# Patient Record
Sex: FEMALE | Race: WHITE | NOT HISPANIC OR LATINO | URBAN - METROPOLITAN AREA
[De-identification: names, ages, dates, MRNs, and addresses within clinical notes are randomized per-mention and may not be internally consistent; named-entity substitution may affect disease eponyms.]

---

## 2019-03-16 ENCOUNTER — HOSPITAL ENCOUNTER (INPATIENT)
Facility: MEDICAL CENTER | Age: 49
LOS: 5 days | DRG: 346 | End: 2019-03-23
Attending: EMERGENCY MEDICINE | Admitting: SURGERY
Payer: COMMERCIAL

## 2019-03-16 DIAGNOSIS — G89.18 POST-OP PAIN: ICD-10-CM

## 2019-03-16 LAB
BASOPHILS # BLD AUTO: 0.4 % (ref 0–1.8)
BASOPHILS # BLD: 0.05 K/UL (ref 0–0.12)
EOSINOPHIL # BLD AUTO: 0.08 K/UL (ref 0–0.51)
EOSINOPHIL NFR BLD: 0.6 % (ref 0–6.9)
ERYTHROCYTE [DISTWIDTH] IN BLOOD BY AUTOMATED COUNT: 47 FL (ref 35.9–50)
HCT VFR BLD AUTO: 44.4 % (ref 37–47)
HGB BLD-MCNC: 14.6 G/DL (ref 12–16)
IMM GRANULOCYTES # BLD AUTO: 0.05 K/UL (ref 0–0.11)
IMM GRANULOCYTES NFR BLD AUTO: 0.4 % (ref 0–0.9)
LYMPHOCYTES # BLD AUTO: 1.7 K/UL (ref 1–4.8)
LYMPHOCYTES NFR BLD: 12.8 % (ref 22–41)
MCH RBC QN AUTO: 28.3 PG (ref 27–33)
MCHC RBC AUTO-ENTMCNC: 32.9 G/DL (ref 33.6–35)
MCV RBC AUTO: 86.2 FL (ref 81.4–97.8)
MONOCYTES # BLD AUTO: 0.68 K/UL (ref 0–0.85)
MONOCYTES NFR BLD AUTO: 5.1 % (ref 0–13.4)
NEUTROPHILS # BLD AUTO: 10.67 K/UL (ref 2–7.15)
NEUTROPHILS NFR BLD: 80.7 % (ref 44–72)
NRBC # BLD AUTO: 0 K/UL
NRBC BLD-RTO: 0 /100 WBC
PLATELET # BLD AUTO: 311 K/UL (ref 164–446)
PMV BLD AUTO: 11.8 FL (ref 9–12.9)
RBC # BLD AUTO: 5.15 M/UL (ref 4.2–5.4)
WBC # BLD AUTO: 13.2 K/UL (ref 4.8–10.8)

## 2019-03-16 PROCEDURE — 83690 ASSAY OF LIPASE: CPT

## 2019-03-16 PROCEDURE — 99285 EMERGENCY DEPT VISIT HI MDM: CPT

## 2019-03-16 PROCEDURE — 36415 COLL VENOUS BLD VENIPUNCTURE: CPT

## 2019-03-16 PROCEDURE — 80053 COMPREHEN METABOLIC PANEL: CPT

## 2019-03-16 PROCEDURE — 80307 DRUG TEST PRSMV CHEM ANLYZR: CPT

## 2019-03-16 PROCEDURE — 85025 COMPLETE CBC W/AUTO DIFF WBC: CPT

## 2019-03-16 RX ORDER — SODIUM CHLORIDE 9 MG/ML
1000 INJECTION, SOLUTION INTRAVENOUS ONCE
Status: COMPLETED | OUTPATIENT
Start: 2019-03-16 | End: 2019-03-17

## 2019-03-16 RX ORDER — MORPHINE SULFATE 4 MG/ML
4 INJECTION, SOLUTION INTRAMUSCULAR; INTRAVENOUS ONCE
Status: COMPLETED | OUTPATIENT
Start: 2019-03-16 | End: 2019-03-17

## 2019-03-16 RX ORDER — ONDANSETRON 2 MG/ML
4 INJECTION INTRAMUSCULAR; INTRAVENOUS ONCE
Status: COMPLETED | OUTPATIENT
Start: 2019-03-16 | End: 2019-03-17

## 2019-03-16 ASSESSMENT — PAIN DESCRIPTION - DESCRIPTORS: DESCRIPTORS: ACHING

## 2019-03-17 ENCOUNTER — HOSPITAL ENCOUNTER (OUTPATIENT)
Dept: RADIOLOGY | Facility: MEDICAL CENTER | Age: 49
End: 2019-03-17
Attending: EMERGENCY MEDICINE

## 2019-03-17 ENCOUNTER — APPOINTMENT (OUTPATIENT)
Dept: RADIOLOGY | Facility: MEDICAL CENTER | Age: 49
DRG: 346 | End: 2019-03-17
Attending: EMERGENCY MEDICINE
Payer: COMMERCIAL

## 2019-03-17 LAB
ALBUMIN SERPL BCP-MCNC: 4 G/DL (ref 3.2–4.9)
ALBUMIN/GLOB SERPL: 1 G/DL
ALP SERPL-CCNC: 103 U/L (ref 30–99)
ALT SERPL-CCNC: 19 U/L (ref 2–50)
AMPHET UR QL SCN: POSITIVE
ANION GAP SERPL CALC-SCNC: 8 MMOL/L (ref 0–11.9)
APPEARANCE UR: CLEAR
AST SERPL-CCNC: 21 U/L (ref 12–45)
BACTERIA #/AREA URNS HPF: ABNORMAL /HPF
BARBITURATES UR QL SCN: NEGATIVE
BENZODIAZ UR QL SCN: NEGATIVE
BILIRUB SERPL-MCNC: 0.8 MG/DL (ref 0.1–1.5)
BILIRUB UR QL STRIP.AUTO: NEGATIVE
BUN SERPL-MCNC: 17 MG/DL (ref 8–22)
BZE UR QL SCN: NEGATIVE
CALCIUM SERPL-MCNC: 9.3 MG/DL (ref 8.5–10.5)
CANNABINOIDS UR QL SCN: POSITIVE
CHLORIDE SERPL-SCNC: 100 MMOL/L (ref 96–112)
CO2 SERPL-SCNC: 27 MMOL/L (ref 20–33)
COLOR UR: YELLOW
CREAT SERPL-MCNC: 0.58 MG/DL (ref 0.5–1.4)
EPI CELLS #/AREA URNS HPF: ABNORMAL /HPF
ETHANOL BLD-MCNC: 0 G/DL
GLOBULIN SER CALC-MCNC: 3.9 G/DL (ref 1.9–3.5)
GLUCOSE SERPL-MCNC: 125 MG/DL (ref 65–99)
GLUCOSE UR STRIP.AUTO-MCNC: NEGATIVE MG/DL
KETONES UR STRIP.AUTO-MCNC: NEGATIVE MG/DL
LEUKOCYTE ESTERASE UR QL STRIP.AUTO: NEGATIVE
LIPASE SERPL-CCNC: 13 U/L (ref 11–82)
METHADONE UR QL SCN: NEGATIVE
MICRO URNS: ABNORMAL
NITRITE UR QL STRIP.AUTO: POSITIVE
OPIATES UR QL SCN: POSITIVE
OXYCODONE UR QL SCN: NEGATIVE
PCP UR QL SCN: NEGATIVE
PH UR STRIP.AUTO: 6.5 [PH]
POTASSIUM SERPL-SCNC: 4.1 MMOL/L (ref 3.6–5.5)
PROPOXYPH UR QL SCN: NEGATIVE
PROT SERPL-MCNC: 7.9 G/DL (ref 6–8.2)
PROT UR QL STRIP: NEGATIVE MG/DL
RBC # URNS HPF: ABNORMAL /HPF
RBC UR QL AUTO: NEGATIVE
SODIUM SERPL-SCNC: 135 MMOL/L (ref 135–145)
SP GR UR STRIP.AUTO: 1.01
UROBILINOGEN UR STRIP.AUTO-MCNC: 0.2 MG/DL
WBC #/AREA URNS HPF: ABNORMAL /HPF

## 2019-03-17 PROCEDURE — 700117 HCHG RX CONTRAST REV CODE 255: Performed by: EMERGENCY MEDICINE

## 2019-03-17 PROCEDURE — 700105 HCHG RX REV CODE 258: Performed by: EMERGENCY MEDICINE

## 2019-03-17 PROCEDURE — 80307 DRUG TEST PRSMV CHEM ANLYZR: CPT

## 2019-03-17 PROCEDURE — 96374 THER/PROPH/DIAG INJ IV PUSH: CPT

## 2019-03-17 PROCEDURE — 74177 CT ABD & PELVIS W/CONTRAST: CPT

## 2019-03-17 PROCEDURE — 96376 TX/PRO/DX INJ SAME DRUG ADON: CPT

## 2019-03-17 PROCEDURE — 304538 HCHG NG TUBE

## 2019-03-17 PROCEDURE — 700111 HCHG RX REV CODE 636 W/ 250 OVERRIDE (IP): Performed by: SURGERY

## 2019-03-17 PROCEDURE — 71045 X-RAY EXAM CHEST 1 VIEW: CPT | Performed by: RADIOLOGY

## 2019-03-17 PROCEDURE — 81001 URINALYSIS AUTO W/SCOPE: CPT

## 2019-03-17 PROCEDURE — 74177 CT ABD & PELVIS W/CONTRAST: CPT | Performed by: RADIOLOGY

## 2019-03-17 PROCEDURE — 96375 TX/PRO/DX INJ NEW DRUG ADDON: CPT

## 2019-03-17 PROCEDURE — 700111 HCHG RX REV CODE 636 W/ 250 OVERRIDE (IP): Performed by: EMERGENCY MEDICINE

## 2019-03-17 PROCEDURE — 71045 X-RAY EXAM CHEST 1 VIEW: CPT

## 2019-03-17 PROCEDURE — G0378 HOSPITAL OBSERVATION PER HR: HCPCS

## 2019-03-17 RX ORDER — SODIUM CHLORIDE, SODIUM LACTATE, POTASSIUM CHLORIDE, CALCIUM CHLORIDE 600; 310; 30; 20 MG/100ML; MG/100ML; MG/100ML; MG/100ML
INJECTION, SOLUTION INTRAVENOUS CONTINUOUS
Status: DISCONTINUED | OUTPATIENT
Start: 2019-03-17 | End: 2019-03-17

## 2019-03-17 RX ORDER — SODIUM CHLORIDE, SODIUM LACTATE, POTASSIUM CHLORIDE, CALCIUM CHLORIDE 600; 310; 30; 20 MG/100ML; MG/100ML; MG/100ML; MG/100ML
INJECTION, SOLUTION INTRAVENOUS CONTINUOUS
Status: DISCONTINUED | OUTPATIENT
Start: 2019-03-17 | End: 2019-03-20

## 2019-03-17 RX ORDER — HYDRALAZINE HYDROCHLORIDE 20 MG/ML
10 INJECTION INTRAMUSCULAR; INTRAVENOUS EVERY 6 HOURS PRN
Status: DISCONTINUED | OUTPATIENT
Start: 2019-03-17 | End: 2019-03-23 | Stop reason: HOSPADM

## 2019-03-17 RX ORDER — MORPHINE SULFATE 4 MG/ML
2 INJECTION, SOLUTION INTRAMUSCULAR; INTRAVENOUS
Status: DISCONTINUED | OUTPATIENT
Start: 2019-03-17 | End: 2019-03-19

## 2019-03-17 RX ORDER — ONDANSETRON 2 MG/ML
4 INJECTION INTRAMUSCULAR; INTRAVENOUS EVERY 4 HOURS PRN
Status: DISCONTINUED | OUTPATIENT
Start: 2019-03-17 | End: 2019-03-23 | Stop reason: HOSPADM

## 2019-03-17 RX ORDER — SODIUM CHLORIDE 9 MG/ML
INJECTION, SOLUTION INTRAVENOUS CONTINUOUS
Status: DISCONTINUED | OUTPATIENT
Start: 2019-03-17 | End: 2019-03-17

## 2019-03-17 RX ADMIN — MORPHINE SULFATE 2 MG: 4 INJECTION INTRAVENOUS at 19:18

## 2019-03-17 RX ADMIN — SODIUM CHLORIDE, POTASSIUM CHLORIDE, SODIUM LACTATE AND CALCIUM CHLORIDE: 600; 310; 30; 20 INJECTION, SOLUTION INTRAVENOUS at 17:51

## 2019-03-17 RX ADMIN — SODIUM CHLORIDE, POTASSIUM CHLORIDE, SODIUM LACTATE AND CALCIUM CHLORIDE: 600; 310; 30; 20 INJECTION, SOLUTION INTRAVENOUS at 07:41

## 2019-03-17 RX ADMIN — ONDANSETRON 4 MG: 2 INJECTION INTRAMUSCULAR; INTRAVENOUS at 02:18

## 2019-03-17 RX ADMIN — MORPHINE SULFATE 2 MG: 4 INJECTION INTRAVENOUS at 07:38

## 2019-03-17 RX ADMIN — SODIUM CHLORIDE 1000 ML: 9 INJECTION, SOLUTION INTRAVENOUS at 00:08

## 2019-03-17 RX ADMIN — MORPHINE SULFATE 2 MG: 4 INJECTION INTRAVENOUS at 21:53

## 2019-03-17 RX ADMIN — ONDANSETRON 4 MG: 2 INJECTION INTRAMUSCULAR; INTRAVENOUS at 21:57

## 2019-03-17 RX ADMIN — IOHEXOL 100 ML: 350 INJECTION, SOLUTION INTRAVENOUS at 00:20

## 2019-03-17 RX ADMIN — ONDANSETRON 4 MG: 2 INJECTION INTRAMUSCULAR; INTRAVENOUS at 07:38

## 2019-03-17 RX ADMIN — MORPHINE SULFATE 4 MG: 4 INJECTION INTRAVENOUS at 02:18

## 2019-03-17 RX ADMIN — ONDANSETRON 4 MG: 2 INJECTION INTRAMUSCULAR; INTRAVENOUS at 12:22

## 2019-03-17 RX ADMIN — MORPHINE SULFATE 2 MG: 4 INJECTION INTRAVENOUS at 12:22

## 2019-03-17 RX ADMIN — HYDRALAZINE HYDROCHLORIDE 10 MG: 20 INJECTION INTRAMUSCULAR; INTRAVENOUS at 17:50

## 2019-03-17 ASSESSMENT — PATIENT HEALTH QUESTIONNAIRE - PHQ9
5. POOR APPETITE OR OVEREATING: NEARLY EVERY DAY
6. FEELING BAD ABOUT YOURSELF - OR THAT YOU ARE A FAILURE OR HAVE LET YOURSELF OR YOUR FAMILY DOWN: NEARLY EVERY DAY
SUM OF ALL RESPONSES TO PHQ9 QUESTIONS 1 AND 2: 5
4. FEELING TIRED OR HAVING LITTLE ENERGY: NEARLY EVERY DAY
4. FEELING TIRED OR HAVING LITTLE ENERGY: NEARLY EVERY DAY
6. FEELING BAD ABOUT YOURSELF - OR THAT YOU ARE A FAILURE OR HAVE LET YOURSELF OR YOUR FAMILY DOWN: NEARLY EVERY DAY
5. POOR APPETITE OR OVEREATING: MORE THAN HALF THE DAYS
1. LITTLE INTEREST OR PLEASURE IN DOING THINGS: MORE THAN HALF THE DAYS
8. MOVING OR SPEAKING SO SLOWLY THAT OTHER PEOPLE COULD HAVE NOTICED. OR THE OPPOSITE, BEING SO FIGETY OR RESTLESS THAT YOU HAVE BEEN MOVING AROUND A LOT MORE THAN USUAL: MORE THAN HALF THE DAYS
9. THOUGHTS THAT YOU WOULD BE BETTER OFF DEAD, OR OF HURTING YOURSELF: SEVERAL DAYS
SUM OF ALL RESPONSES TO PHQ QUESTIONS 1-9: 20
SUM OF ALL RESPONSES TO PHQ9 QUESTIONS 1 AND 2: 5
3. TROUBLE FALLING OR STAYING ASLEEP OR SLEEPING TOO MUCH: MORE THAN HALF THE DAYS
8. MOVING OR SPEAKING SO SLOWLY THAT OTHER PEOPLE COULD HAVE NOTICED. OR THE OPPOSITE, BEING SO FIGETY OR RESTLESS THAT YOU HAVE BEEN MOVING AROUND A LOT MORE THAN USUAL: MORE THAN HALF THE DAYS
2. FEELING DOWN, DEPRESSED, IRRITABLE, OR HOPELESS: NEARLY EVERY DAY
2. FEELING DOWN, DEPRESSED, IRRITABLE, OR HOPELESS: NEARLY EVERY DAY
1. LITTLE INTEREST OR PLEASURE IN DOING THINGS: MORE THAN HALF THE DAYS
3. TROUBLE FALLING OR STAYING ASLEEP OR SLEEPING TOO MUCH: NEARLY EVERY DAY
9. THOUGHTS THAT YOU WOULD BE BETTER OFF DEAD, OR OF HURTING YOURSELF: NOT AT ALL
7. TROUBLE CONCENTRATING ON THINGS, SUCH AS READING THE NEWSPAPER OR WATCHING TELEVISION: NEARLY EVERY DAY
7. TROUBLE CONCENTRATING ON THINGS, SUCH AS READING THE NEWSPAPER OR WATCHING TELEVISION: MORE THAN HALF THE DAYS
SUM OF ALL RESPONSES TO PHQ QUESTIONS 1-9: 22

## 2019-03-17 ASSESSMENT — COPD QUESTIONNAIRES
IN THE PAST 12 MONTHS DO YOU DO LESS THAN YOU USED TO BECAUSE OF YOUR BREATHING PROBLEMS: AGREE
DURING THE PAST 4 WEEKS HOW MUCH DID YOU FEEL SHORT OF BREATH: SOME OF THE TIME
COPD SCREENING SCORE: 5
HAVE YOU SMOKED AT LEAST 100 CIGARETTES IN YOUR ENTIRE LIFE: YES
DO YOU EVER COUGH UP ANY MUCUS OR PHLEGM?: YES, A FEW DAYS A WEEK OR MONTH

## 2019-03-17 ASSESSMENT — LIFESTYLE VARIABLES
ALCOHOL_USE: NO
EVER_SMOKED: YES

## 2019-03-17 NOTE — ED NOTES
Pt BIB [place] [facility] from [employer] for N/V with blood x 1 day. Pt denies ETOH or drug use. Pt is affiliated [facility] with 160/112 BP Pt given 4mg Zofran IM pta due to lack of PIV access. Chart up for ERP

## 2019-03-17 NOTE — ED NOTES
Med Rec completed per Pt at bedside  Allergies reviewed  No ABX in last 30 days    Pt denies taking any RX's or OTC's

## 2019-03-17 NOTE — ED NOTES
Dr. Gómez changed chest tube dressing which is now clean, dry and intact. Pigtail is connected to continuous suction.
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
## 2019-03-18 ENCOUNTER — APPOINTMENT (OUTPATIENT)
Dept: RADIOLOGY | Facility: MEDICAL CENTER | Age: 49
DRG: 346 | End: 2019-03-18
Attending: SURGERY
Payer: COMMERCIAL

## 2019-03-18 LAB
ANION GAP SERPL CALC-SCNC: 7 MMOL/L (ref 0–11.9)
BUN SERPL-MCNC: 14 MG/DL (ref 8–22)
CALCIUM SERPL-MCNC: 8.9 MG/DL (ref 8.5–10.5)
CHLORIDE SERPL-SCNC: 99 MMOL/L (ref 96–112)
CO2 SERPL-SCNC: 25 MMOL/L (ref 20–33)
CREAT SERPL-MCNC: 0.57 MG/DL (ref 0.5–1.4)
ERYTHROCYTE [DISTWIDTH] IN BLOOD BY AUTOMATED COUNT: 49.1 FL (ref 35.9–50)
GLUCOSE SERPL-MCNC: 94 MG/DL (ref 65–99)
HCT VFR BLD AUTO: 42.4 % (ref 37–47)
HGB BLD-MCNC: 13.3 G/DL (ref 12–16)
MCH RBC QN AUTO: 28.2 PG (ref 27–33)
MCHC RBC AUTO-ENTMCNC: 31.4 G/DL (ref 33.6–35)
MCV RBC AUTO: 89.8 FL (ref 81.4–97.8)
PLATELET # BLD AUTO: 248 K/UL (ref 164–446)
PMV BLD AUTO: 11.5 FL (ref 9–12.9)
POTASSIUM SERPL-SCNC: 4.1 MMOL/L (ref 3.6–5.5)
RBC # BLD AUTO: 4.72 M/UL (ref 4.2–5.4)
SODIUM SERPL-SCNC: 131 MMOL/L (ref 135–145)
WBC # BLD AUTO: 10.3 K/UL (ref 4.8–10.8)

## 2019-03-18 PROCEDURE — 770006 HCHG ROOM/CARE - MED/SURG/GYN SEMI*

## 2019-03-18 PROCEDURE — 700117 HCHG RX CONTRAST REV CODE 255: Performed by: SURGERY

## 2019-03-18 PROCEDURE — 85027 COMPLETE CBC AUTOMATED: CPT

## 2019-03-18 PROCEDURE — 36415 COLL VENOUS BLD VENIPUNCTURE: CPT

## 2019-03-18 PROCEDURE — 700111 HCHG RX REV CODE 636 W/ 250 OVERRIDE (IP): Performed by: SURGERY

## 2019-03-18 PROCEDURE — 80048 BASIC METABOLIC PNL TOTAL CA: CPT

## 2019-03-18 PROCEDURE — 700105 HCHG RX REV CODE 258: Performed by: EMERGENCY MEDICINE

## 2019-03-18 PROCEDURE — 96376 TX/PRO/DX INJ SAME DRUG ADON: CPT

## 2019-03-18 RX ADMIN — ONDANSETRON 4 MG: 2 INJECTION INTRAMUSCULAR; INTRAVENOUS at 20:20

## 2019-03-18 RX ADMIN — MORPHINE SULFATE 2 MG: 4 INJECTION INTRAVENOUS at 11:21

## 2019-03-18 RX ADMIN — MORPHINE SULFATE 2 MG: 4 INJECTION INTRAVENOUS at 04:14

## 2019-03-18 RX ADMIN — MORPHINE SULFATE 2 MG: 4 INJECTION INTRAVENOUS at 18:44

## 2019-03-18 RX ADMIN — SODIUM CHLORIDE, POTASSIUM CHLORIDE, SODIUM LACTATE AND CALCIUM CHLORIDE: 600; 310; 30; 20 INJECTION, SOLUTION INTRAVENOUS at 11:21

## 2019-03-18 RX ADMIN — MORPHINE SULFATE 2 MG: 4 INJECTION INTRAVENOUS at 21:27

## 2019-03-18 RX ADMIN — ONDANSETRON 4 MG: 2 INJECTION INTRAMUSCULAR; INTRAVENOUS at 02:19

## 2019-03-18 RX ADMIN — MORPHINE SULFATE 2 MG: 4 INJECTION INTRAVENOUS at 23:38

## 2019-03-18 RX ADMIN — MORPHINE SULFATE 2 MG: 4 INJECTION INTRAVENOUS at 06:40

## 2019-03-18 RX ADMIN — MORPHINE SULFATE 2 MG: 4 INJECTION INTRAVENOUS at 02:16

## 2019-03-18 RX ADMIN — ONDANSETRON 4 MG: 2 INJECTION INTRAMUSCULAR; INTRAVENOUS at 15:31

## 2019-03-18 RX ADMIN — MORPHINE SULFATE 2 MG: 4 INJECTION INTRAVENOUS at 09:10

## 2019-03-18 RX ADMIN — MORPHINE SULFATE 2 MG: 4 INJECTION INTRAVENOUS at 16:40

## 2019-03-18 RX ADMIN — IOHEXOL 200 ML: 350 INJECTION, SOLUTION INTRAVENOUS at 13:00

## 2019-03-18 RX ADMIN — SODIUM CHLORIDE, POTASSIUM CHLORIDE, SODIUM LACTATE AND CALCIUM CHLORIDE: 600; 310; 30; 20 INJECTION, SOLUTION INTRAVENOUS at 02:23

## 2019-03-18 RX ADMIN — SODIUM CHLORIDE, POTASSIUM CHLORIDE, SODIUM LACTATE AND CALCIUM CHLORIDE: 600; 310; 30; 20 INJECTION, SOLUTION INTRAVENOUS at 23:39

## 2019-03-18 RX ADMIN — MORPHINE SULFATE 2 MG: 4 INJECTION INTRAVENOUS at 14:41

## 2019-03-18 ASSESSMENT — COGNITIVE AND FUNCTIONAL STATUS - GENERAL
STANDING UP FROM CHAIR USING ARMS: A LITTLE
DAILY ACTIVITIY SCORE: 21
MOVING FROM LYING ON BACK TO SITTING ON SIDE OF FLAT BED: A LITTLE
MOBILITY SCORE: 19
WALKING IN HOSPITAL ROOM: A LITTLE
DRESSING REGULAR LOWER BODY CLOTHING: A LITTLE
DRESSING REGULAR UPPER BODY CLOTHING: A LITTLE
MOVING TO AND FROM BED TO CHAIR: A LITTLE
CLIMB 3 TO 5 STEPS WITH RAILING: A LITTLE
SUGGESTED CMS G CODE MODIFIER DAILY ACTIVITY: CJ
SUGGESTED CMS G CODE MODIFIER MOBILITY: CK
HELP NEEDED FOR BATHING: A LITTLE

## 2019-03-18 NOTE — PROGRESS NOTES
"Assumed care of patient from RN at 0700.     Reviewed VS, labs, orders, and notes.     Pt drowsing off, arosable . A&Ox 4.    /84   Pulse (!) 105 Comment: RN notified  Temp 36.9 °C (98.5 °F) (Temporal)   Resp 14   Ht 1.626 m (5' 4\")   Wt 76.2 kg (168 lb)   LMP 03/01/2019 (Approximate)   SpO2 98%   Breastfeeding? No   BMI 28.84 kg/m² . MEWS Score: 2.     2 L oxygen. Denies SOB.     Moves all extremities, sandor numbness or tingling.     Skin assessed over bony prominences and under medical devices.    Voiding, normoactive BS, denies flatus, LBM PTA.     NPO with ice chips, denies nausea/ vomiting. NG tube to right nares to wall suction.     Wound(s): generalized bruising; healing black eyes.     Patient reports 8-10 /10 pain in abdomen, medicated per MAR.     Pt. is SBA assist with FWW.    Bed alarm on. Fall prevention education reinforced with pt. Pt is wearing treaded slipper socks, IV pole is on the same side as the bathroom, lower bedrails are down.    Discussed POC, all questions answered at this time. Bed is locked and in the lowest position, call light within reach, Q 1 hour rounding in place. All needs met at this time.   "
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
## 2019-03-18 NOTE — PROGRESS NOTES
3/18  Pt seen and examined, still no flatus or BM overnight, NGT remains in place with bilious output.  She is sitting up in bed, still having lower abdominal pain.    Abdomen benign, no peritonitis  NGT- 600cc overnight  SBFT ordered for today to see if obstruction resolving vs operable transition point

## 2019-03-18 NOTE — CARE PLAN
Problem: Pain Management  Goal: Pain level will decrease to patient's comfort goal  Outcome: PROGRESSING SLOWER THAN EXPECTED  Patient moaning frequently, rocking back and forth. 10/10 pain with muscle spasms reported.     Problem: Psychosocial Needs:  Goal: Level of anxiety will decrease  Outcome: PROGRESSING SLOWER THAN EXPECTED  Patient focused upon pain. Unable to discuss condition/anxiety/restlessness at present time.     Problem: Bowel/Gastric:  Goal: Normal bowel function is maintained or improved  Outcome: PROGRESSING AS EXPECTED  Hyperactive BS LUQ, Hypoactive BS in remainder of abdomen. MIVF running. Ambulation encouraged/refused.
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
## 2019-03-19 ENCOUNTER — APPOINTMENT (OUTPATIENT)
Dept: RADIOLOGY | Facility: MEDICAL CENTER | Age: 49
DRG: 346 | End: 2019-03-19
Attending: SURGERY
Payer: COMMERCIAL

## 2019-03-19 ENCOUNTER — APPOINTMENT (OUTPATIENT)
Dept: RADIOLOGY | Facility: MEDICAL CENTER | Age: 49
DRG: 346 | End: 2019-03-19
Attending: ANESTHESIOLOGY
Payer: COMMERCIAL

## 2019-03-19 ENCOUNTER — ANESTHESIA (OUTPATIENT)
Dept: SURGERY | Facility: MEDICAL CENTER | Age: 49
DRG: 346 | End: 2019-03-19
Payer: COMMERCIAL

## 2019-03-19 ENCOUNTER — ANESTHESIA EVENT (OUTPATIENT)
Dept: SURGERY | Facility: MEDICAL CENTER | Age: 49
DRG: 346 | End: 2019-03-19
Payer: COMMERCIAL

## 2019-03-19 LAB
ABO GROUP BLD: NORMAL
ABO GROUP BLD: NORMAL
ALBUMIN SERPL BCP-MCNC: 3.3 G/DL (ref 3.2–4.9)
ALBUMIN/GLOB SERPL: 1.1 G/DL
ALP SERPL-CCNC: 72 U/L (ref 30–99)
ALT SERPL-CCNC: 13 U/L (ref 2–50)
ANION GAP SERPL CALC-SCNC: 8 MMOL/L (ref 0–11.9)
AST SERPL-CCNC: 16 U/L (ref 12–45)
BILIRUB SERPL-MCNC: 0.7 MG/DL (ref 0.1–1.5)
BLD GP AB SCN SERPL QL: NORMAL
BUN SERPL-MCNC: 15 MG/DL (ref 8–22)
CALCIUM SERPL-MCNC: 8.7 MG/DL (ref 8.5–10.5)
CHLORIDE SERPL-SCNC: 98 MMOL/L (ref 96–112)
CO2 SERPL-SCNC: 31 MMOL/L (ref 20–33)
CREAT SERPL-MCNC: 0.53 MG/DL (ref 0.5–1.4)
GLOBULIN SER CALC-MCNC: 3.1 G/DL (ref 1.9–3.5)
GLUCOSE SERPL-MCNC: 87 MG/DL (ref 65–99)
HCG SERPL QL: NEGATIVE
POTASSIUM SERPL-SCNC: 3.7 MMOL/L (ref 3.6–5.5)
PROT SERPL-MCNC: 6.4 G/DL (ref 6–8.2)
RH BLD: NORMAL
RH BLD: NORMAL
SODIUM SERPL-SCNC: 137 MMOL/L (ref 135–145)

## 2019-03-19 PROCEDURE — 160036 HCHG PACU - EA ADDL 30 MINS PHASE I: Performed by: SURGERY

## 2019-03-19 PROCEDURE — 700101 HCHG RX REV CODE 250

## 2019-03-19 PROCEDURE — 700105 HCHG RX REV CODE 258: Performed by: EMERGENCY MEDICINE

## 2019-03-19 PROCEDURE — 770006 HCHG ROOM/CARE - MED/SURG/GYN SEMI*

## 2019-03-19 PROCEDURE — 501838 HCHG SUTURE GENERAL: Performed by: SURGERY

## 2019-03-19 PROCEDURE — 36415 COLL VENOUS BLD VENIPUNCTURE: CPT

## 2019-03-19 PROCEDURE — 700105 HCHG RX REV CODE 258

## 2019-03-19 PROCEDURE — 700101 HCHG RX REV CODE 250: Performed by: ANESTHESIOLOGY

## 2019-03-19 PROCEDURE — 700111 HCHG RX REV CODE 636 W/ 250 OVERRIDE (IP): Performed by: ANESTHESIOLOGY

## 2019-03-19 PROCEDURE — 160035 HCHG PACU - 1ST 60 MINS PHASE I: Performed by: SURGERY

## 2019-03-19 PROCEDURE — C1765 ADHESION BARRIER: HCPCS | Performed by: SURGERY

## 2019-03-19 PROCEDURE — 0DC80ZZ EXTIRPATION OF MATTER FROM SMALL INTESTINE, OPEN APPROACH: ICD-10-PCS | Performed by: SURGERY

## 2019-03-19 PROCEDURE — 700111 HCHG RX REV CODE 636 W/ 250 OVERRIDE (IP)

## 2019-03-19 PROCEDURE — 93005 ELECTROCARDIOGRAM TRACING: CPT | Performed by: SURGERY

## 2019-03-19 PROCEDURE — 160029 HCHG SURGERY MINUTES - 1ST 30 MINS LEVEL 4: Performed by: SURGERY

## 2019-03-19 PROCEDURE — 84703 CHORIONIC GONADOTROPIN ASSAY: CPT

## 2019-03-19 PROCEDURE — 0WJP4ZZ INSPECTION OF GASTROINTESTINAL TRACT, PERCUTANEOUS ENDOSCOPIC APPROACH: ICD-10-PCS | Performed by: SURGERY

## 2019-03-19 PROCEDURE — 501570 HCHG TROCAR, SEPARATOR: Performed by: SURGERY

## 2019-03-19 PROCEDURE — 86850 RBC ANTIBODY SCREEN: CPT

## 2019-03-19 PROCEDURE — 86901 BLOOD TYPING SEROLOGIC RH(D): CPT

## 2019-03-19 PROCEDURE — 160041 HCHG SURGERY MINUTES - EA ADDL 1 MIN LEVEL 4: Performed by: SURGERY

## 2019-03-19 PROCEDURE — 500800 HCHG LAPAROSCOPIC J/L HOOK: Performed by: SURGERY

## 2019-03-19 PROCEDURE — 80053 COMPREHEN METABOLIC PANEL: CPT

## 2019-03-19 PROCEDURE — 700111 HCHG RX REV CODE 636 W/ 250 OVERRIDE (IP): Performed by: SURGERY

## 2019-03-19 PROCEDURE — 86900 BLOOD TYPING SEROLOGIC ABO: CPT

## 2019-03-19 PROCEDURE — 74250 X-RAY XM SM INT 1CNTRST STD: CPT

## 2019-03-19 PROCEDURE — 160048 HCHG OR STATISTICAL LEVEL 1-5: Performed by: SURGERY

## 2019-03-19 PROCEDURE — 71045 X-RAY EXAM CHEST 1 VIEW: CPT

## 2019-03-19 PROCEDURE — 501338 HCHG SHEARS, ENDO: Performed by: SURGERY

## 2019-03-19 PROCEDURE — A4314 CATH W/DRAINAGE 2-WAY LATEX: HCPCS | Performed by: SURGERY

## 2019-03-19 PROCEDURE — 501445 HCHG STAPLER, SKIN DISP: Performed by: SURGERY

## 2019-03-19 PROCEDURE — 88305 TISSUE EXAM BY PATHOLOGIST: CPT

## 2019-03-19 PROCEDURE — 160002 HCHG RECOVERY MINUTES (STAT): Performed by: SURGERY

## 2019-03-19 PROCEDURE — 160009 HCHG ANES TIME/MIN: Performed by: SURGERY

## 2019-03-19 PROCEDURE — 501583 HCHG TROCAR, THRD CAN&SEAL 5X100: Performed by: SURGERY

## 2019-03-19 PROCEDURE — 93010 ELECTROCARDIOGRAM REPORT: CPT | Performed by: INTERNAL MEDICINE

## 2019-03-19 RX ORDER — HYDROMORPHONE HYDROCHLORIDE 1 MG/ML
0.1 INJECTION, SOLUTION INTRAMUSCULAR; INTRAVENOUS; SUBCUTANEOUS
Status: DISCONTINUED | OUTPATIENT
Start: 2019-03-19 | End: 2019-03-19 | Stop reason: HOSPADM

## 2019-03-19 RX ORDER — HALOPERIDOL 5 MG/ML
1 INJECTION INTRAMUSCULAR
Status: DISCONTINUED | OUTPATIENT
Start: 2019-03-19 | End: 2019-03-19 | Stop reason: HOSPADM

## 2019-03-19 RX ORDER — DIPHENHYDRAMINE HYDROCHLORIDE 50 MG/ML
25 INJECTION INTRAMUSCULAR; INTRAVENOUS EVERY 6 HOURS PRN
Status: DISCONTINUED | OUTPATIENT
Start: 2019-03-19 | End: 2019-03-23 | Stop reason: HOSPADM

## 2019-03-19 RX ORDER — LORAZEPAM 2 MG/ML
0.5 INJECTION INTRAMUSCULAR
Status: DISCONTINUED | OUTPATIENT
Start: 2019-03-19 | End: 2019-03-19 | Stop reason: HOSPADM

## 2019-03-19 RX ORDER — SCOLOPAMINE TRANSDERMAL SYSTEM 1 MG/1
1 PATCH, EXTENDED RELEASE TRANSDERMAL
Status: DISCONTINUED | OUTPATIENT
Start: 2019-03-19 | End: 2019-03-23 | Stop reason: HOSPADM

## 2019-03-19 RX ORDER — SODIUM CHLORIDE, SODIUM LACTATE, POTASSIUM CHLORIDE, CALCIUM CHLORIDE 600; 310; 30; 20 MG/100ML; MG/100ML; MG/100ML; MG/100ML
INJECTION, SOLUTION INTRAVENOUS
Status: COMPLETED | OUTPATIENT
Start: 2019-03-19 | End: 2019-03-19

## 2019-03-19 RX ORDER — DIPHENHYDRAMINE HYDROCHLORIDE 50 MG/ML
12.5 INJECTION INTRAMUSCULAR; INTRAVENOUS
Status: DISCONTINUED | OUTPATIENT
Start: 2019-03-19 | End: 2019-03-19 | Stop reason: HOSPADM

## 2019-03-19 RX ORDER — OXYCODONE HCL 5 MG/5 ML
5 SOLUTION, ORAL ORAL
Status: DISCONTINUED | OUTPATIENT
Start: 2019-03-19 | End: 2019-03-19 | Stop reason: HOSPADM

## 2019-03-19 RX ORDER — DEXAMETHASONE SODIUM PHOSPHATE 4 MG/ML
4 INJECTION, SOLUTION INTRA-ARTICULAR; INTRALESIONAL; INTRAMUSCULAR; INTRAVENOUS; SOFT TISSUE
Status: DISCONTINUED | OUTPATIENT
Start: 2019-03-19 | End: 2019-03-23 | Stop reason: HOSPADM

## 2019-03-19 RX ORDER — BUPIVACAINE HYDROCHLORIDE AND EPINEPHRINE 5; 5 MG/ML; UG/ML
INJECTION, SOLUTION EPIDURAL; INTRACAUDAL; PERINEURAL
Status: DISCONTINUED | OUTPATIENT
Start: 2019-03-19 | End: 2019-03-19 | Stop reason: HOSPADM

## 2019-03-19 RX ORDER — SODIUM CHLORIDE, SODIUM LACTATE, POTASSIUM CHLORIDE, CALCIUM CHLORIDE 600; 310; 30; 20 MG/100ML; MG/100ML; MG/100ML; MG/100ML
INJECTION, SOLUTION INTRAVENOUS CONTINUOUS
Status: DISCONTINUED | OUTPATIENT
Start: 2019-03-19 | End: 2019-03-19 | Stop reason: HOSPADM

## 2019-03-19 RX ORDER — TRAZODONE HYDROCHLORIDE 50 MG/1
50 TABLET ORAL NIGHTLY PRN
Status: DISCONTINUED | OUTPATIENT
Start: 2019-03-19 | End: 2019-03-23 | Stop reason: HOSPADM

## 2019-03-19 RX ORDER — SODIUM CHLORIDE 9 MG/ML
INJECTION, SOLUTION INTRAVENOUS
Status: COMPLETED
Start: 2019-03-19 | End: 2019-03-19

## 2019-03-19 RX ORDER — HALOPERIDOL 5 MG/ML
1 INJECTION INTRAMUSCULAR EVERY 6 HOURS PRN
Status: DISCONTINUED | OUTPATIENT
Start: 2019-03-19 | End: 2019-03-23 | Stop reason: HOSPADM

## 2019-03-19 RX ORDER — DEXAMETHASONE SODIUM PHOSPHATE 4 MG/ML
INJECTION, SOLUTION INTRA-ARTICULAR; INTRALESIONAL; INTRAMUSCULAR; INTRAVENOUS; SOFT TISSUE PRN
Status: DISCONTINUED | OUTPATIENT
Start: 2019-03-19 | End: 2019-03-19 | Stop reason: SURG

## 2019-03-19 RX ORDER — MORPHINE SULFATE 4 MG/ML
4 INJECTION, SOLUTION INTRAMUSCULAR; INTRAVENOUS
Status: DISCONTINUED | OUTPATIENT
Start: 2019-03-19 | End: 2019-03-19

## 2019-03-19 RX ORDER — CALCIUM CARBONATE 500 MG/1
500 TABLET, CHEWABLE ORAL
Status: DISCONTINUED | OUTPATIENT
Start: 2019-03-19 | End: 2019-03-23 | Stop reason: HOSPADM

## 2019-03-19 RX ORDER — HYDROMORPHONE HYDROCHLORIDE 1 MG/ML
0.4 INJECTION, SOLUTION INTRAMUSCULAR; INTRAVENOUS; SUBCUTANEOUS
Status: DISCONTINUED | OUTPATIENT
Start: 2019-03-19 | End: 2019-03-19 | Stop reason: HOSPADM

## 2019-03-19 RX ORDER — ONDANSETRON 2 MG/ML
4 INJECTION INTRAMUSCULAR; INTRAVENOUS EVERY 4 HOURS PRN
Status: DISCONTINUED | OUTPATIENT
Start: 2019-03-19 | End: 2019-03-19

## 2019-03-19 RX ORDER — MAGNESIUM HYDROXIDE 1200 MG/15ML
LIQUID ORAL
Status: COMPLETED | OUTPATIENT
Start: 2019-03-19 | End: 2019-03-19

## 2019-03-19 RX ORDER — CEFOTETAN DISODIUM 2 G/20ML
INJECTION, POWDER, FOR SOLUTION INTRAMUSCULAR; INTRAVENOUS PRN
Status: DISCONTINUED | OUTPATIENT
Start: 2019-03-19 | End: 2019-03-19 | Stop reason: SURG

## 2019-03-19 RX ORDER — ACETAMINOPHEN 500 MG
1000 TABLET ORAL EVERY 6 HOURS
Status: DISCONTINUED | OUTPATIENT
Start: 2019-03-19 | End: 2019-03-23 | Stop reason: HOSPADM

## 2019-03-19 RX ORDER — KETOROLAC TROMETHAMINE 30 MG/ML
INJECTION, SOLUTION INTRAMUSCULAR; INTRAVENOUS PRN
Status: DISCONTINUED | OUTPATIENT
Start: 2019-03-19 | End: 2019-03-19 | Stop reason: SURG

## 2019-03-19 RX ORDER — ONDANSETRON 2 MG/ML
4 INJECTION INTRAMUSCULAR; INTRAVENOUS
Status: DISCONTINUED | OUTPATIENT
Start: 2019-03-19 | End: 2019-03-19 | Stop reason: HOSPADM

## 2019-03-19 RX ORDER — HYDRALAZINE HYDROCHLORIDE 20 MG/ML
5 INJECTION INTRAMUSCULAR; INTRAVENOUS
Status: DISCONTINUED | OUTPATIENT
Start: 2019-03-19 | End: 2019-03-19 | Stop reason: HOSPADM

## 2019-03-19 RX ORDER — KETOROLAC TROMETHAMINE 30 MG/ML
30 INJECTION, SOLUTION INTRAMUSCULAR; INTRAVENOUS EVERY 6 HOURS
Status: COMPLETED | OUTPATIENT
Start: 2019-03-19 | End: 2019-03-22

## 2019-03-19 RX ORDER — HYDROMORPHONE HYDROCHLORIDE 1 MG/ML
0.2 INJECTION, SOLUTION INTRAMUSCULAR; INTRAVENOUS; SUBCUTANEOUS
Status: DISCONTINUED | OUTPATIENT
Start: 2019-03-19 | End: 2019-03-19 | Stop reason: HOSPADM

## 2019-03-19 RX ORDER — OXYCODONE HCL 5 MG/5 ML
10 SOLUTION, ORAL ORAL
Status: DISCONTINUED | OUTPATIENT
Start: 2019-03-19 | End: 2019-03-19 | Stop reason: HOSPADM

## 2019-03-19 RX ADMIN — SODIUM CHLORIDE 500 ML: 9 INJECTION, SOLUTION INTRAVENOUS at 20:11

## 2019-03-19 RX ADMIN — SODIUM CHLORIDE, POTASSIUM CHLORIDE, SODIUM LACTATE AND CALCIUM CHLORIDE: 600; 310; 30; 20 INJECTION, SOLUTION INTRAVENOUS at 09:44

## 2019-03-19 RX ADMIN — MORPHINE SULFATE 2 MG: 4 INJECTION INTRAVENOUS at 13:03

## 2019-03-19 RX ADMIN — DEXAMETHASONE SODIUM PHOSPHATE 4 MG: 4 INJECTION, SOLUTION INTRAMUSCULAR; INTRAVENOUS at 15:16

## 2019-03-19 RX ADMIN — MORPHINE SULFATE 2 MG: 4 INJECTION INTRAVENOUS at 02:03

## 2019-03-19 RX ADMIN — MORPHINE SULFATE 2 MG: 4 INJECTION INTRAVENOUS at 06:39

## 2019-03-19 RX ADMIN — HYDROMORPHONE HYDROCHLORIDE 0.2 MG: 1 INJECTION, SOLUTION INTRAMUSCULAR; INTRAVENOUS; SUBCUTANEOUS at 18:22

## 2019-03-19 RX ADMIN — SODIUM CHLORIDE: 9 INJECTION, SOLUTION INTRAVENOUS at 20:30

## 2019-03-19 RX ADMIN — PROPOFOL 200 MG: 10 INJECTION, EMULSION INTRAVENOUS at 15:16

## 2019-03-19 RX ADMIN — FENTANYL CITRATE 50 MCG: 50 INJECTION, SOLUTION INTRAMUSCULAR; INTRAVENOUS at 16:05

## 2019-03-19 RX ADMIN — ROCURONIUM BROMIDE 50 MG: 10 INJECTION, SOLUTION INTRAVENOUS at 15:16

## 2019-03-19 RX ADMIN — SODIUM CHLORIDE, POTASSIUM CHLORIDE, SODIUM LACTATE AND CALCIUM CHLORIDE: 600; 310; 30; 20 INJECTION, SOLUTION INTRAVENOUS at 15:16

## 2019-03-19 RX ADMIN — FENTANYL CITRATE 50 MCG: 50 INJECTION, SOLUTION INTRAMUSCULAR; INTRAVENOUS at 17:17

## 2019-03-19 RX ADMIN — FENTANYL CITRATE 50 MCG: 50 INJECTION, SOLUTION INTRAMUSCULAR; INTRAVENOUS at 17:50

## 2019-03-19 RX ADMIN — FENTANYL CITRATE 50 MCG: 50 INJECTION, SOLUTION INTRAMUSCULAR; INTRAVENOUS at 15:16

## 2019-03-19 RX ADMIN — ONDANSETRON 4 MG: 2 INJECTION INTRAMUSCULAR; INTRAVENOUS at 15:16

## 2019-03-19 RX ADMIN — MORPHINE SULFATE 2 MG: 4 INJECTION INTRAVENOUS at 04:53

## 2019-03-19 RX ADMIN — DIPHENHYDRAMINE HYDROCHLORIDE 25 MG: 50 INJECTION INTRAMUSCULAR; INTRAVENOUS at 20:30

## 2019-03-19 RX ADMIN — ONDANSETRON 4 MG: 2 INJECTION INTRAMUSCULAR; INTRAVENOUS at 02:03

## 2019-03-19 RX ADMIN — ONDANSETRON 4 MG: 2 INJECTION INTRAMUSCULAR; INTRAVENOUS at 06:37

## 2019-03-19 RX ADMIN — ONDANSETRON 4 MG: 2 INJECTION INTRAMUSCULAR; INTRAVENOUS at 19:19

## 2019-03-19 RX ADMIN — MORPHINE SULFATE: 50 INJECTION, SOLUTION, CONCENTRATE INTRAVENOUS at 19:55

## 2019-03-19 RX ADMIN — KETOROLAC TROMETHAMINE 30 MG: 30 INJECTION, SOLUTION INTRAMUSCULAR at 15:16

## 2019-03-19 RX ADMIN — CEFOTETAN DISODIUM 2 G: 2 INJECTION, POWDER, FOR SOLUTION INTRAMUSCULAR; INTRAVENOUS at 15:00

## 2019-03-19 RX ADMIN — FENTANYL CITRATE 50 MCG: 50 INJECTION, SOLUTION INTRAMUSCULAR; INTRAVENOUS at 15:45

## 2019-03-19 RX ADMIN — KETOROLAC TROMETHAMINE 30 MG: 30 INJECTION, SOLUTION INTRAMUSCULAR; INTRAVENOUS at 20:11

## 2019-03-19 RX ADMIN — ONDANSETRON 4 MG: 2 INJECTION INTRAMUSCULAR; INTRAVENOUS at 13:14

## 2019-03-19 RX ADMIN — HYDROMORPHONE HYDROCHLORIDE 0.4 MG: 1 INJECTION, SOLUTION INTRAMUSCULAR; INTRAVENOUS; SUBCUTANEOUS at 18:07

## 2019-03-19 RX ADMIN — FENTANYL CITRATE 50 MCG: 50 INJECTION, SOLUTION INTRAMUSCULAR; INTRAVENOUS at 15:00

## 2019-03-19 RX ADMIN — HYDROMORPHONE HYDROCHLORIDE 0.4 MG: 1 INJECTION, SOLUTION INTRAMUSCULAR; INTRAVENOUS; SUBCUTANEOUS at 17:53

## 2019-03-19 RX ADMIN — FENTANYL CITRATE 50 MCG: 50 INJECTION, SOLUTION INTRAMUSCULAR; INTRAVENOUS at 17:40

## 2019-03-19 RX ADMIN — MORPHINE SULFATE 2 MG: 4 INJECTION INTRAVENOUS at 09:21

## 2019-03-19 ASSESSMENT — PAIN SCALES - GENERAL: PAIN_LEVEL: 4

## 2019-03-19 NOTE — OR SURGEON
Immediate Post OP Note    PreOp Diagnosis: Small Bowel Obstruction    PostOp Diagnosis: SBO secondary to intestinal Bezor    Procedure(s):  DIAGNOSTIC LAPAROSCOPY - Wound Class: Clean Contaminated  LAPAROTOMY, EXPLORATORY - Wound Class: Clean Contaminated    Surgeon(s):  Manish Padron M.D.    Anesthesiologist/Type of Anesthesia:  Anesthesiologist: Kristofer Bustamante M.D./General    Surgical Staff:  Circulator: Lise Jacobo R.N.  Relief Circulator: Katherine Velarde R.N.  Relief Scrub: Francia Denise  Scrub Person: Zac Way Ass't; Missymarie Clifford R.N.    Specimens removed if any:  ID Type Source Tests Collected by Time Destination   A : Small bowel bezoar Other Other PATHOLOGY SPECIMEN Manish Padron M.D. 3/19/2019  4:32 PM    B : Omentum Other Other PATHOLOGY SPECIMEN Manish Padron M.D. 3/19/2019  4:44 PM        Estimated Blood Loss: 50cc    Findings: Inflamed dilated small bowel to the terminal ileum.  Open exploration necessary, identified a bezor in the small bowel.  Enterotomy placed and object extracted, closure with hemostasis.      Complications: none        3/19/2019 4:55 PM Manish Padron M.D.

## 2019-03-19 NOTE — PROGRESS NOTES
3/19  Pt seen and examined, had SBFT yesterday without production of BM.  Series shows dilated small bowel still present overnight with contrast.  Symptoms are not improved today and she still has some abdominal pain and distension.  Better with NGT to suction, which is still producing bilious output.      Abdomen soft, mildly distended, no peritonitis    SBO without improvement.  To OR today for exploration.  Will attempt laparoscopic lysis of adhesions, possible open surgery as indicated.  Explained procedure to patient and she agrees with treatment plan.  She is NPO, keep NGT to suction.

## 2019-03-19 NOTE — PROGRESS NOTES
"Received report from AM RN; assumed care. A&O 2-4 (patient not answering questions), BP mildly elevated, tachycardia noted. Medicated for pain/nausea frequently; see MAR. NG tube clamped for KUB series. Partial assessment completed, patient refusing d/t pain/nausea. Patient \"just wants to sleep.\" Pain reports 10/10 with patient falling asleep shortly after MS/Zofran administration. Patient refused to get out of bed d/t pain/nausea. Awaiting until bladder is full to void, then hastily hurrying to the bathroom with SBA, steady/limping gait noted. Bed alarm engaged. + void. UTD flatus. LBM PTA. Hyperactive BS x 4.  NG tube in right nare with tape/primpore attached. Abdomen soft, tender. Patient loudly stating, \"don't touch my stomach.\" NPO, ice chips provided.     Patient not allowing education/discussions. POC still not discussed except for KUB series. Patient nearly refused contrast until instructed entire process would have to be performed again by radiology tech.      Bed locked/lowest position. Call light/personal belongings within reach. Patient across from nurses station. Bed alarm in place. All needs met at present/patient sleeping at present time.   "

## 2019-03-19 NOTE — PROGRESS NOTES
Xray taken and need to be repeated. Xray tech returned and injected more contrast. Patient to be kept clamped. Repeat xray to be performed at 0300.

## 2019-03-19 NOTE — PROGRESS NOTES
"Assumed care of patient from RN at 0700.      Reviewed VS, labs, orders, and notes. Dr Padron planning surgery today, consent is signed and in chart.     Pt drowsing off, arosable . A&Ox 4.     /84   Pulse (!) 105 Comment: RN notified  Temp 36.9 °C (98.5 °F) (Temporal)   Resp 14   Ht 1.626 m (5' 4\")   Wt 76.2 kg (168 lb)   LMP 03/01/2019 (Approximate)   SpO2 98%   Breastfeeding? No   BMI 28.84 kg/m² . MEWS Score: 1.      2 L oxygen. Denies SOB.      Moves all extremities, sandor numbness or tingling.      Skin assessed over bony prominences and under medical devices.     Voiding, hyperactive BS, denies flatus, LBM PTA.      NPO with ice chips, naseous.previously medicated per MAR NG tube to right nares to wall suction will large green output.      Wound(s): generalized bruising; healing bilateral black eyes.      Patient reports 7 /10 pain in abdomen, medicated per MAR.      Pt. is SBA assist with FWW.     Bed alarm on. Fall prevention education reinforced with pt. Pt is wearing treaded slipper socks, IV pole is on the same side as the bathroom, lower bedrails are down.     Discussed POC, all questions answered at this time. Bed is locked and in the lowest position, call light within reach, Q 1 hour rounding in place. All needs met at this time.   "
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
## 2019-03-19 NOTE — ANESTHESIA PREPROCEDURE EVALUATION
Relevant Problems   No relevant active problems       Physical Exam    Airway   Mallampati: II  TM distance: >3 FB  Neck ROM: full       Cardiovascular - normal exam  Rhythm: regular  Rate: normal  (-) murmur     Dental - normal exam  (+) upper dentures, lower dentures         Pulmonary - normal exam  Breath sounds clear to auscultation     Abdominal   Abdomen: tender     Neurological - normal exam                 Anesthesia Plan    ASA 3 - emergent   ASA physical status 3 criteria: COPD - poorly controlled, respiratory insufficiency or compromise, alcohol and/or substance dependence or abuse and CVA or TIA - history (> 3 months)ASA physical status emergent criteria: acute peritonitis and compromised vital organ, limb or tissue    Plan - general             Induction: intravenous    Postoperative Plan: Postoperative administration of opioids is intended.    Pertinent diagnostic labs and testing reviewed    Informed Consent:    Anesthetic plan and risks discussed with patient.    Use of blood products discussed with: patient whom consented to blood products.

## 2019-03-19 NOTE — CARE PLAN
Problem: Nutritional:  Goal: Achieve adequate nutritional intake  Advance diet beyond CLD and patient will consume 50% or greater of meals  Outcome: NOT MET  See RD note from 3/19

## 2019-03-19 NOTE — CARE PLAN
"Problem: Communication  Goal: The ability to communicate needs accurately and effectively will improve  Outcome: PROGRESSING SLOWER THAN EXPECTED  Patient not engaging with communicating/questioning. Patient able to articulate pain/nausea, moaning and yelling for \"help.\" Patient repeating that she feels like \"shit.\"     Problem: Safety  Goal: Will remain free from injury  Outcome: PROGRESSING SLOWER THAN EXPECTED  Patient refusing to get out of bed. Patient waiting until bladder full to ambulate to the bathroom. Attempted to educated upon risk for falling. Patient told to slow down when ambulating to the bathroom, not complying with instruction. Bed alarm on/engaged. Patient room next to RN station with RN sitting outside of door.     Problem: Infection  Goal: Will remain free from infection  Outcome: NOT MET  Patient refusing hygiene. Refusing education.       "

## 2019-03-19 NOTE — PROGRESS NOTES
Received call from XRay, requesting to keep NG clamped, will redo Xray in 6 hours. Large emesis noted after Xray study. Patient expressed relief after emesis.

## 2019-03-19 NOTE — PROGRESS NOTES
"MD approached about KUB series not working and plan for patient. MD said \"nothing is moving\" and that patient would be taken down to the OR today. Place patient back on NG wall suction. Endorsed to AM RN.   "

## 2019-03-20 LAB
ANION GAP SERPL CALC-SCNC: 7 MMOL/L (ref 0–11.9)
BUN SERPL-MCNC: 19 MG/DL (ref 8–22)
CALCIUM SERPL-MCNC: 8.3 MG/DL (ref 8.5–10.5)
CHLORIDE SERPL-SCNC: 99 MMOL/L (ref 96–112)
CO2 SERPL-SCNC: 30 MMOL/L (ref 20–33)
CREAT SERPL-MCNC: 0.57 MG/DL (ref 0.5–1.4)
EKG IMPRESSION: NORMAL
ERYTHROCYTE [DISTWIDTH] IN BLOOD BY AUTOMATED COUNT: 48.1 FL (ref 35.9–50)
GLUCOSE SERPL-MCNC: 113 MG/DL (ref 65–99)
HCT VFR BLD AUTO: 37.9 % (ref 37–47)
HGB BLD-MCNC: 12.1 G/DL (ref 12–16)
MAGNESIUM SERPL-MCNC: 1.5 MG/DL (ref 1.5–2.5)
MCH RBC QN AUTO: 27.9 PG (ref 27–33)
MCHC RBC AUTO-ENTMCNC: 31.9 G/DL (ref 33.6–35)
MCV RBC AUTO: 87.5 FL (ref 81.4–97.8)
PATHOLOGY CONSULT NOTE: NORMAL
PLATELET # BLD AUTO: 248 K/UL (ref 164–446)
PMV BLD AUTO: 11.4 FL (ref 9–12.9)
POTASSIUM SERPL-SCNC: 3.9 MMOL/L (ref 3.6–5.5)
RBC # BLD AUTO: 4.33 M/UL (ref 4.2–5.4)
SODIUM SERPL-SCNC: 136 MMOL/L (ref 135–145)
WBC # BLD AUTO: 3.1 K/UL (ref 4.8–10.8)

## 2019-03-20 PROCEDURE — 80048 BASIC METABOLIC PNL TOTAL CA: CPT

## 2019-03-20 PROCEDURE — 700105 HCHG RX REV CODE 258

## 2019-03-20 PROCEDURE — 700105 HCHG RX REV CODE 258: Performed by: SURGERY

## 2019-03-20 PROCEDURE — 83735 ASSAY OF MAGNESIUM: CPT

## 2019-03-20 PROCEDURE — A9270 NON-COVERED ITEM OR SERVICE: HCPCS | Performed by: SURGERY

## 2019-03-20 PROCEDURE — 700101 HCHG RX REV CODE 250: Performed by: SURGERY

## 2019-03-20 PROCEDURE — 700111 HCHG RX REV CODE 636 W/ 250 OVERRIDE (IP): Performed by: SURGERY

## 2019-03-20 PROCEDURE — 770006 HCHG ROOM/CARE - MED/SURG/GYN SEMI*

## 2019-03-20 PROCEDURE — 85027 COMPLETE CBC AUTOMATED: CPT

## 2019-03-20 PROCEDURE — 700102 HCHG RX REV CODE 250 W/ 637 OVERRIDE(OP): Performed by: SURGERY

## 2019-03-20 RX ORDER — SODIUM CHLORIDE 9 MG/ML
INJECTION, SOLUTION INTRAVENOUS
Status: COMPLETED
Start: 2019-03-20 | End: 2019-03-20

## 2019-03-20 RX ORDER — DEXTROSE MONOHYDRATE, SODIUM CHLORIDE, AND POTASSIUM CHLORIDE 50; 1.49; 4.5 G/1000ML; G/1000ML; G/1000ML
INJECTION, SOLUTION INTRAVENOUS CONTINUOUS
Status: DISCONTINUED | OUTPATIENT
Start: 2019-03-20 | End: 2019-03-21

## 2019-03-20 RX ADMIN — MORPHINE SULFATE: 50 INJECTION, SOLUTION, CONCENTRATE INTRAVENOUS at 14:44

## 2019-03-20 RX ADMIN — SODIUM CHLORIDE 1000 ML: 9 INJECTION, SOLUTION INTRAVENOUS at 12:11

## 2019-03-20 RX ADMIN — KETOROLAC TROMETHAMINE 30 MG: 30 INJECTION, SOLUTION INTRAMUSCULAR; INTRAVENOUS at 06:02

## 2019-03-20 RX ADMIN — POTASSIUM CHLORIDE, DEXTROSE MONOHYDRATE AND SODIUM CHLORIDE: 150; 5; 450 INJECTION, SOLUTION INTRAVENOUS at 09:58

## 2019-03-20 RX ADMIN — SODIUM CHLORIDE 500 ML: 9 INJECTION, SOLUTION INTRAVENOUS at 12:10

## 2019-03-20 RX ADMIN — ONDANSETRON 4 MG: 2 INJECTION INTRAMUSCULAR; INTRAVENOUS at 18:56

## 2019-03-20 RX ADMIN — KETOROLAC TROMETHAMINE 30 MG: 30 INJECTION, SOLUTION INTRAMUSCULAR; INTRAVENOUS at 17:47

## 2019-03-20 RX ADMIN — ONDANSETRON 4 MG: 2 INJECTION INTRAMUSCULAR; INTRAVENOUS at 14:36

## 2019-03-20 RX ADMIN — ACETAMINOPHEN 1000 MG: 500 TABLET ORAL at 01:18

## 2019-03-20 RX ADMIN — KETOROLAC TROMETHAMINE 30 MG: 30 INJECTION, SOLUTION INTRAMUSCULAR; INTRAVENOUS at 01:18

## 2019-03-20 RX ADMIN — ONDANSETRON 4 MG: 2 INJECTION INTRAMUSCULAR; INTRAVENOUS at 04:48

## 2019-03-20 RX ADMIN — KETOROLAC TROMETHAMINE 30 MG: 30 INJECTION, SOLUTION INTRAMUSCULAR; INTRAVENOUS at 11:56

## 2019-03-20 RX ADMIN — ENOXAPARIN SODIUM 40 MG: 100 INJECTION SUBCUTANEOUS at 09:53

## 2019-03-20 RX ADMIN — SODIUM CHLORIDE, POTASSIUM CHLORIDE, SODIUM LACTATE AND CALCIUM CHLORIDE: 600; 310; 30; 20 INJECTION, SOLUTION INTRAVENOUS at 04:50

## 2019-03-20 NOTE — PROGRESS NOTES
Pt is A&O 4  Pain well controlled on PCA.  Pt slightly lethargic throughout night, easily aroused with voice stimuli  Denies nausea  Pt currently NPO with sips for meds, asking to be advanced to clear liquids.  Pt has NG tube to low continuous suction.  Surgical incision to midline, CDI, midline dressing has scant shadowing.  + Urine Void per wells catheter, order to d/c in AM  - Flatus  - BM Void  Up standby with FWW, pt declines to get OOB this PM d/t pain   SCD's on  Bed alarm on  Reviewed plan of care with patient, bed in lowest position and locked, pt resting comfortably now, call light within reach, all needs met at this time

## 2019-03-20 NOTE — OR NURSING
1719: received to PACU via bed. Respirations spontaneous and unlabored. Midline abdominal dressing CDI. 3 surgical lap site wounds with gauze and tape CDI. Ice pack placed to abdomen abdomen soft. NGT to low contrant suction. abdomen soft.  1740: c/o pain. Medicated. See MAR  1750: pain scale 10/10, medicated. See MAR.  1753: still moaning. C/o pain. Medicated. See MAR  1807: pain scale 8/10. Medicated. See MAR  1817: Dr. Bustamante notified or X'ray results. Okay to use central line.  1822: still c/o pain. Medicated. See MAR  1843: report called to Marycarmen WADE.  1850: Patient refused to get out of bed in PACU.  1900: transported via bed to [place] [id number] by transport with O2 at 10 L / Oxymask. Stable.

## 2019-03-20 NOTE — ANESTHESIA PROCEDURE NOTES
Central Venous Line  Performed by: CHRISTELLE GRIGSBY  Authorized by: CHRISTELLE GRIGSBY     Start Time:  3/19/2019 5:00 PM  End Time:  3/19/2019 5:05 PM  Patient Location:  OR  Indication: central venous access    provider hand hygiene performed prior to central venous catheter insertion, all 5 sterile barriers used (gloves, gown, cap, mask, large sterile drape) during central venous catheter insertion and skin prep agent completely dried prior to procedure    Patient Position:  Trendelenburg  Laterality:  Right  Site:  Internal jugular  Prep:  Chlorhexidine  Catheter Size:  7 Fr  Number of Lumens:  Triple lumen  target vein identified, needle advanced into vein and blood aspirated and guidewire advanced into vein    Seldinger Technique?: Yes    Ultrasound-Guided: ultrasound-guided  Image captured, interpreted and electronically stored.  Sterile Gel and Probe Cover Used for Ultrasound?: Yes    Intravenous Verification: verified by ultrasound, venous blood return and chest x-ray pending    all ports aspirated, all ports flushed easily, guidewire was removed intact, biopatch was applied, line was sutured in place and dressing was applied    Events: patient tolerated procedure well with no complications

## 2019-03-20 NOTE — PROGRESS NOTES
Report received.  Assumed Care.  Pt in bed, T416 bed 1. AOx4, responds appropriately. Medicated for pain with ordered PCA, currently pain 10/10, denies SOB.  Plan of care discussed.  Explained importance of calling before getting OOB and pt verbalizes understanding.  Call light and belongings within reach, treaded slipper socks on, bed alarm in use, bed in lowest position.  Will monitor frequently.

## 2019-03-20 NOTE — PROGRESS NOTES
"3/20  S:  49 y.o.female s/p ExLap with enterotomy for removal of a bezoar  POD# 1.  Patient doing well this morning, sitting up in bed, feels hungry, wants to start PO.  NGT with minimal output overnight, 150cc only entire shift.  Pain well controlled with PCA at this time as well    O:  Blood pressure 108/85, pulse 100, temperature 36.1 °C (97 °F), temperature source Temporal, resp. rate 18, height 1.626 m (5' 4\"), weight 76.2 kg (168 lb), last menstrual period 03/01/2019, SpO2 95 %, not currently breastfeeding.  I/O last 3 completed shifts:  In: 2980 [I.V.:2980]  Out: 2395 [Urine:375]  Recent Labs      03/18/19   0307  03/19/19   1459  03/20/19   0520   SODIUM  131*  137  136   POTASSIUM  4.1  3.7  3.9   CHLORIDE  99  98  99   CO2  25  31  30   GLUCOSE  94  87  113*   BUN  14  15  19   CREATININE  0.57  0.53  0.57   CALCIUM  8.9  8.7  8.3*     Recent Labs      03/18/19   0307  03/20/19   0520   WBC  10.3  3.1*   RBC  4.72  4.33   HEMOGLOBIN  13.3  12.1   HEMATOCRIT  42.4  37.9   MCV  89.8  87.5   MCH  28.2  27.9   MCHC  31.4*  31.9*   RDW  49.1  48.1   PLATELETCT  248  248   MPV  11.5  11.4       Alert and Oriented x3, No Acute Distress  Normal Respiratory Effort  Abdomen soft, appropriately tender  Incisions/Bandages clean/dry/intact  Extremities warm and well perfused  NGT- 1700cc yesterday, 150 only overnight    A/P:  Pain control with PCA until tolerating PO  Diet clear liquids after NGT removed  Fluids continue maintence  Ambulate tid and ad kamar    "

## 2019-03-20 NOTE — CARE PLAN
Problem: Venous Thromboembolism (VTW)/Deep Vein Thrombosis (DVT) Prevention:  Goal: Patient will participate in Venous Thrombosis (VTE)/Deep Vein Thrombosis (DVT)Prevention Measures  Outcome: PROGRESSING AS EXPECTED  SCD's on.   Lovenox in place.   Pt encouraged to ambulate.     Problem: Mobility  Goal: Risk for activity intolerance will decrease    Intervention: Encourage patient to increase activity level in collaboration with Interdisciplinary Team  Patient encouraged to increase mobility with the assistance of staff.   Education provided regarding importance of increased mobility in relation to recovery- pt states she understands the importance.

## 2019-03-20 NOTE — PROGRESS NOTES
Bedside report received.  Assessment complete.  A&O x 4. Patient calls appropriately.  Patient up with one assist. Bed alarm on.   Patient has 6/10 pain. PCA in place.   Denies N&V. Tolerating sips of clears at this point- clear liquid diet to start this AM.  Surgical incision noted to abdomen, dressing is clean, dry, and intact.  Cheney removed this AM, pt needs to void by 1400, denies flatus, no BM.  Patient denies SOB.  SCD's on.  Patient resting in bed.  Review plan with of care with patient. Call light and personal belongings with in reach. Hourly rounding in place. All needs met at this time.

## 2019-03-20 NOTE — ANESTHESIA TIME REPORT
Anesthesia Start and Stop Event Times     Date Time Event    3/19/2019 1500 Anesthesia Start     1737 Anesthesia Stop        Responsible Staff  03/19/19    Name Role Begin End    Kristofer Bustamante M.D. Anesth 1500 1737        Post op Diagnosis  Bowel obstruction (HCC)    Premium Reason  B. 1st Call      Exception Times    Start Time             End Time                     #                         Name                                                Comments:      Central line placement

## 2019-03-20 NOTE — CARE PLAN
Problem: Safety  Goal: Will remain free from falls  Outcome: PROGRESSING AS EXPECTED  Pt has bed alarm in place, educated not to get OOB without staff present.  Pt states understanding, has call light within reach, calls appropriately at this time

## 2019-03-20 NOTE — CARE PLAN
Problem: Pain Management  Goal: Pain level will decrease to patient's comfort goal  Outcome: PROGRESSING AS EXPECTED  Pt's pain adequately controlled with PCA at this time

## 2019-03-20 NOTE — OP REPORT
DATE OF SERVICE:  03/19/2019    PREOPERATIVE DIAGNOSIS:  Small-bowel obstruction.    POSTOPERATIVE DIAGNOSIS:  Small-bowel obstruction secondary to bezoar.    PROCEDURES PERFORMED:  1.  Diagnostic laparoscopy.  2.  Exploratory laparotomy.    SURGEON:  Manish Padron MD    ASSISTANT:  None.    ANESTHESIA:  General endotracheal anesthesia.    ANESTHESIOLOGIST:  Kristofer Bustamante MD    ESTIMATED BLOOD LOSS:  50 mL    SPECIMENS:  1.  Small bowel bezoar.  2.  Omentum.    COMPLICATIONS:  None.    CONDITION:  Stable.    INDICATIONS FOR PROCEDURE:  This is a 49-year-old female who presents with a   small-bowel obstruction and a history of exploratory surgery for trauma.  The   patient has refractory symptoms after 48 hours in the hospital, she had a   small bowel follow through, which demonstrated ongoing blockage in the small   intestine.  Risks, benefits, and alternatives of exploratory surgery were   explained to her and we took her to the OR for further procedure.    OPERATIVE FINDINGS:  Dilated small bowel all the way to the terminal ileum   with little to no small bowel adhesions present.  Cecum mobilized with no   evidence of large tumor.  Open exploratory surgery performed with palpation of   the small bowel and discovery of a bezoar in the distal terminal ileum.    Enterotomy completed with extraction of the offending object and closure with   hemostasis.    OPERATIVE TECHNIQUE:  After informed consent was obtained, the patient was   taken to the operating room and placed in the supine position.  After adequate   endotracheal anesthesia was achieved, the abdomen was prepped and draped in a   sterile fashion.  Operation was begun by placing a 5 mm left upper quadrant   incision through which a 5 mm trocar was introduced into the abdomen using   Optiview technique.  After pneumoperitoneum was achieved, additional trocars   were placed, two 5 mm trocars in the left flank.  All trocars were placed with   0.5%  Marcaine with epinephrine for local anesthesia.    We then began by taking down some omental adhesions from the anterior   abdominal wall with cautery.  Once we were able to do this, we had good   visualization of most of the abdomen.  We then began by exploring the distal   small bowel.  The cecum was in the retroperitoneum, but we were able to   identify the appendix.  We then used cautery to free up the lateral   attachments of the cecum such that we could mobilize it upwards.  Again, due   to the dilated state of the small bowel, this was difficult to visualize, but   we could determine that there was no adhesive disease among the small bowel   and there did not appear to be a clear sharp transition point in the terminal   ileum.  Suspicion was raised for a neoplasm versus some other process causing   the blockage; therefore, we decided to proceed with open surgery.    Next, we made a midline laparotomy incision after removing the trocars.  This   was carried down with electrocautery to the level of fascia, which was incised   in the midline.  Incision was extended and we placed handheld retractors and   we were able to palpate the cecum and ileocecal valve.  We could not identify   any tumor or mass at this location.  We were, however, able to palpate the   small bowel and we discovered an object within the lumen of small intestine   causing the blockage.  This was milked proximally.  We then toweled off the   segment of the small bowel, placed a longitudinal incision, which was a full   thickness enterotomy.  We then extracted what appeared to be a food bezoar out   of the small bowel.  Once this was extracted, there did not appear to be any   other obstruction lesions present.    We closed the enterotomy in layers transversely with interrupted 3-0 Vicryl   sutures.  We then were able to milk enteric contents past the closure with no   evidence of leak and we noticed filling of the cecum and transverse colon    indicating the obstruction had been relieved.    The bowel was all reduced back into the abdominal cavity.  The abdomen was   washed with hemostasis.  We then placed Seprafilm under and above the omentum.    We closed the abdominal wall with 2 running 0 PDS sutures tied in the   midline.  Deep dermal tissues were closed with 3-0 Vicryl and skin with   staples.  We closed laparoscopic port sites with staples as well.  All   instruments counts were correct at the end of the procedure.  The patient was   returned to the PACU in stable condition.       ____________________________________     MD HAMLET Archibald / ROSSY    DD:  03/19/2019 17:16:24  DT:  03/19/2019 18:28:28    D#:  [id number]  Job#:  [id number]

## 2019-03-20 NOTE — ANESTHESIA POSTPROCEDURE EVALUATION
Patient: Bhumi Granado    Procedure Summary     Date:  03/19/19 Room / Location:  [facility] OR 08 / SURGERY [facility] ORS    Anesthesia Start:  1500 Anesthesia Stop:      Procedures:       DIAGNOSTIC LAPAROSCOPY (Abdomen)      LAPAROTOMY, EXPLORATORY (Abdomen) Diagnosis:  (Abdominal pain and distention)    Surgeon:  Manish Padron M.D. Responsible Provider:  Kristofer Bustamante M.D.    Anesthesia Type:  general ASA Status:  3 - Emergent          Final Anesthesia Type: general  Last vitals  BP   Blood Pressure: 120/74, NIBP: (!) 165/102    Temp   36.7 °C (98.1 °F)    Pulse   Pulse: 97, Heart Rate (Monitored): 96   Resp   16    SpO2   99 %      Anesthesia Post Evaluation    Patient location during evaluation: PACU  Patient participation: complete - patient participated  Level of consciousness: awake and alert  Pain score: 4    Airway patency: patent  Anesthetic complications: no  Cardiovascular status: hemodynamically stable  Respiratory status: acceptable  Hydration status: euvolemic    PONV: none           Nurse Pain Score: 10 (NPRS)

## 2019-03-20 NOTE — ANESTHESIA QCDR
2019 [facility] Clinical Data Registry (for Quality Improvement)     Postoperative nausea/vomiting risk protocol (Adult = 18 yrs and Pediatric 3-17 yrs)- (430 and 463)  General inhalation anesthetic (NOT TIVA) with PONV risk factors: Yes  Provision of anti-emetic therapy with at least 2 different classes of agents: Yes   Patient DID NOT receive anti-emetic therapy and reason is documented in Medical Record:  N/A    Multimodal Pain Management- (AQI59)  Patient undergoing Elective Surgery (i.e. Outpatient, or ASC, or Prescheduled Surgery prior to Hospital Admission): Yes  Use of Multimodal Pain Management, two or more drugs and/or interventions, NOT including systemic opioids: Yes   Exception: Documented allergy to multiple classes of analgesics:  N/A    PACU assessment of acute postoperative pain prior to Anesthesia Care End- Applies to Patients Age = 18- (ABG7)  Initial PACU pain score is which of the following: < 7/10  Patient unable to report pain score: N/A    Post-anesthetic transfer of care checklist/protocol to PACU/ICU- (426 and 427)  Upon conclusion of case, patient transferred to which of the following locations: PACU/Non-ICU  Use of transfer checklist/protocol: Yes  Exclusion: Service Performed in Patient Hospital Room (and thus did not require transfer): N/A    PACU Reintubation- (AQI31)  General anesthesia requiring endotracheal intubation (ETT) along with subsequent extubation in OR or PACU: No  Required reintubation in the PACU: N/A  Extubation was a planned trial documented in the medical record prior to removal of the original airway device: N/A    Unplanned admission to ICU related to anesthesia service up through end of PACU care- (MD51)  Unplanned admission to ICU (not initially anticipated at anesthesia start time): No

## 2019-03-21 LAB
ANION GAP SERPL CALC-SCNC: 6 MMOL/L (ref 0–11.9)
BUN SERPL-MCNC: 13 MG/DL (ref 8–22)
CALCIUM SERPL-MCNC: 7.7 MG/DL (ref 8.5–10.5)
CHLORIDE SERPL-SCNC: 100 MMOL/L (ref 96–112)
CO2 SERPL-SCNC: 29 MMOL/L (ref 20–33)
CREAT SERPL-MCNC: 0.49 MG/DL (ref 0.5–1.4)
ERYTHROCYTE [DISTWIDTH] IN BLOOD BY AUTOMATED COUNT: 50.3 FL (ref 35.9–50)
GLUCOSE SERPL-MCNC: 139 MG/DL (ref 65–99)
HCT VFR BLD AUTO: 30.7 % (ref 37–47)
HGB BLD-MCNC: 9.8 G/DL (ref 12–16)
MCH RBC QN AUTO: 28.4 PG (ref 27–33)
MCHC RBC AUTO-ENTMCNC: 31.9 G/DL (ref 33.6–35)
MCV RBC AUTO: 89 FL (ref 81.4–97.8)
PLATELET # BLD AUTO: 221 K/UL (ref 164–446)
PMV BLD AUTO: 11.2 FL (ref 9–12.9)
POTASSIUM SERPL-SCNC: 3.4 MMOL/L (ref 3.6–5.5)
RBC # BLD AUTO: 3.45 M/UL (ref 4.2–5.4)
SODIUM SERPL-SCNC: 135 MMOL/L (ref 135–145)
WBC # BLD AUTO: 5.3 K/UL (ref 4.8–10.8)

## 2019-03-21 PROCEDURE — A9270 NON-COVERED ITEM OR SERVICE: HCPCS | Performed by: SURGERY

## 2019-03-21 PROCEDURE — 700111 HCHG RX REV CODE 636 W/ 250 OVERRIDE (IP): Performed by: SURGERY

## 2019-03-21 PROCEDURE — 80048 BASIC METABOLIC PNL TOTAL CA: CPT

## 2019-03-21 PROCEDURE — 700101 HCHG RX REV CODE 250: Performed by: SURGERY

## 2019-03-21 PROCEDURE — 85027 COMPLETE CBC AUTOMATED: CPT

## 2019-03-21 PROCEDURE — 700102 HCHG RX REV CODE 250 W/ 637 OVERRIDE(OP): Performed by: SURGERY

## 2019-03-21 PROCEDURE — 770006 HCHG ROOM/CARE - MED/SURG/GYN SEMI*

## 2019-03-21 RX ORDER — OXYCODONE HYDROCHLORIDE 5 MG/1
5 TABLET ORAL
Status: DISCONTINUED | OUTPATIENT
Start: 2019-03-21 | End: 2019-03-23 | Stop reason: HOSPADM

## 2019-03-21 RX ORDER — OXYCODONE HYDROCHLORIDE 10 MG/1
10 TABLET ORAL
Status: DISCONTINUED | OUTPATIENT
Start: 2019-03-21 | End: 2019-03-23 | Stop reason: HOSPADM

## 2019-03-21 RX ADMIN — ONDANSETRON 4 MG: 2 INJECTION INTRAMUSCULAR; INTRAVENOUS at 05:48

## 2019-03-21 RX ADMIN — KETOROLAC TROMETHAMINE 30 MG: 30 INJECTION, SOLUTION INTRAMUSCULAR; INTRAVENOUS at 18:07

## 2019-03-21 RX ADMIN — ONDANSETRON 4 MG: 2 INJECTION INTRAMUSCULAR; INTRAVENOUS at 23:09

## 2019-03-21 RX ADMIN — ACETAMINOPHEN 1000 MG: 500 TABLET ORAL at 01:08

## 2019-03-21 RX ADMIN — ENOXAPARIN SODIUM 40 MG: 100 INJECTION SUBCUTANEOUS at 09:47

## 2019-03-21 RX ADMIN — KETOROLAC TROMETHAMINE 30 MG: 30 INJECTION, SOLUTION INTRAMUSCULAR; INTRAVENOUS at 05:41

## 2019-03-21 RX ADMIN — OXYCODONE HYDROCHLORIDE 5 MG: 5 TABLET ORAL at 18:16

## 2019-03-21 RX ADMIN — ACETAMINOPHEN 1000 MG: 500 TABLET ORAL at 13:21

## 2019-03-21 RX ADMIN — POTASSIUM CHLORIDE, DEXTROSE MONOHYDRATE AND SODIUM CHLORIDE: 150; 5; 450 INJECTION, SOLUTION INTRAVENOUS at 03:30

## 2019-03-21 RX ADMIN — KETOROLAC TROMETHAMINE 30 MG: 30 INJECTION, SOLUTION INTRAMUSCULAR; INTRAVENOUS at 01:08

## 2019-03-21 RX ADMIN — ONDANSETRON 4 MG: 2 INJECTION INTRAMUSCULAR; INTRAVENOUS at 18:08

## 2019-03-21 RX ADMIN — OXYCODONE HYDROCHLORIDE 10 MG: 10 TABLET ORAL at 23:09

## 2019-03-21 RX ADMIN — KETOROLAC TROMETHAMINE 30 MG: 30 INJECTION, SOLUTION INTRAMUSCULAR; INTRAVENOUS at 13:22

## 2019-03-21 RX ADMIN — ACETAMINOPHEN 1000 MG: 500 TABLET ORAL at 05:41

## 2019-03-21 RX ADMIN — ONDANSETRON 4 MG: 2 INJECTION INTRAMUSCULAR; INTRAVENOUS at 01:08

## 2019-03-21 RX ADMIN — MORPHINE SULFATE: 50 INJECTION, SOLUTION, CONCENTRATE INTRAVENOUS at 09:34

## 2019-03-21 NOTE — PROGRESS NOTES
Pt is A&O 4  Pain controlled on current regimen.  Pt has PCA in place, utilizing as needed  Slight nausea, controlled with zofran per MAR  Tolerating Diet clears  Surgical incision to midline, CDI, dressing has scant old drainage  + Urine Void   + Flatus  - BM Void  Up standby   SCD's on  Bed alarm on, moderate fall risk per Jane ethan scale  Reviewed plan of care with patient, bed in lowest position and locked, pt resting comfortably now, call light within reach, all needs met at this time

## 2019-03-21 NOTE — PROGRESS NOTES
Pt alert and oriented. Walking 1 assist. Dressings dry and intact. Cont clear liquid diet. Active bowel sounds, passing gas. Cont PCA morphine, pain controlled. Voiding adequate amounts. hgb 9.8, monitor. Cont plan of care.

## 2019-03-21 NOTE — DIETARY
NUTRITION SERVICES - Brief Nutrition POC Update    NPO / clear liquids day 5.  Pt with hx of poor PO intake x1 day PTA.     Clear liquids initiated 3/20 and PO intake has been 50-75% x1 and % x1 meal on this diet. Pt seen in room this afternoon. She states that she is hungry and wants her diet advanced. D/w MD and he would like to round on the pt and determine whether diet advancement is feasible at that time. OK to add Boost breeze supplements in the interim to support her nutritional intake.    Plan:  1) Diet advancement per MD when feasible. Boost Breeze TID with meals until diet is advanced.    RD following.

## 2019-03-21 NOTE — CARE PLAN
Problem: Safety  Goal: Will remain free from injury  Outcome: PROGRESSING AS EXPECTED  Pt educated to call prior to getting OOB, pt states understanding, has call light within reach.  Pt has bed alarm in place, moderate fall risk per dwayne long    Problem: Fluid Volume:  Goal: Will maintain balanced intake and output  Outcome: PROGRESSING AS EXPECTED  Pt voiding adequately, has IV fluids per MAR

## 2019-03-22 LAB
ERYTHROCYTE [DISTWIDTH] IN BLOOD BY AUTOMATED COUNT: 50.4 FL (ref 35.9–50)
HCT VFR BLD AUTO: 32 % (ref 37–47)
HGB BLD-MCNC: 10.2 G/DL (ref 12–16)
MCH RBC QN AUTO: 28.4 PG (ref 27–33)
MCHC RBC AUTO-ENTMCNC: 31.9 G/DL (ref 33.6–35)
MCV RBC AUTO: 89.1 FL (ref 81.4–97.8)
PLATELET # BLD AUTO: 242 K/UL (ref 164–446)
PMV BLD AUTO: 11.5 FL (ref 9–12.9)
RBC # BLD AUTO: 3.59 M/UL (ref 4.2–5.4)
WBC # BLD AUTO: 6.3 K/UL (ref 4.8–10.8)

## 2019-03-22 PROCEDURE — 700111 HCHG RX REV CODE 636 W/ 250 OVERRIDE (IP): Performed by: SURGERY

## 2019-03-22 PROCEDURE — A9270 NON-COVERED ITEM OR SERVICE: HCPCS | Performed by: SURGERY

## 2019-03-22 PROCEDURE — 770006 HCHG ROOM/CARE - MED/SURG/GYN SEMI*

## 2019-03-22 PROCEDURE — 85027 COMPLETE CBC AUTOMATED: CPT

## 2019-03-22 PROCEDURE — 700102 HCHG RX REV CODE 250 W/ 637 OVERRIDE(OP): Performed by: SURGERY

## 2019-03-22 RX ADMIN — ACETAMINOPHEN 1000 MG: 500 TABLET ORAL at 00:57

## 2019-03-22 RX ADMIN — ENOXAPARIN SODIUM 40 MG: 100 INJECTION SUBCUTANEOUS at 10:13

## 2019-03-22 RX ADMIN — KETOROLAC TROMETHAMINE 30 MG: 30 INJECTION, SOLUTION INTRAMUSCULAR; INTRAVENOUS at 05:57

## 2019-03-22 RX ADMIN — ACETAMINOPHEN 1000 MG: 500 TABLET ORAL at 17:00

## 2019-03-22 RX ADMIN — KETOROLAC TROMETHAMINE 30 MG: 30 INJECTION, SOLUTION INTRAMUSCULAR; INTRAVENOUS at 00:57

## 2019-03-22 RX ADMIN — OXYCODONE HYDROCHLORIDE 10 MG: 10 TABLET ORAL at 06:04

## 2019-03-22 RX ADMIN — KETOROLAC TROMETHAMINE 30 MG: 30 INJECTION, SOLUTION INTRAMUSCULAR; INTRAVENOUS at 12:04

## 2019-03-22 RX ADMIN — OXYCODONE HYDROCHLORIDE 10 MG: 10 TABLET ORAL at 10:13

## 2019-03-22 RX ADMIN — OXYCODONE HYDROCHLORIDE 10 MG: 10 TABLET ORAL at 21:09

## 2019-03-22 RX ADMIN — ACETAMINOPHEN 1000 MG: 500 TABLET ORAL at 12:04

## 2019-03-22 RX ADMIN — ACETAMINOPHEN 1000 MG: 500 TABLET ORAL at 05:57

## 2019-03-22 RX ADMIN — OXYCODONE HYDROCHLORIDE 10 MG: 10 TABLET ORAL at 16:46

## 2019-03-22 NOTE — CARE PLAN
Problem: Pain Management  Goal: Pain level will decrease to patient’s comfort goal  Outcome: PROGRESSING AS EXPECTED  Pt has pain in abdomen, receiving PRN pain meds per MAR, repositioned for comfort. Non-pharmacologic options offered.    Problem: Mobility  Goal: Risk for activity intolerance will decrease  Outcome: PROGRESSING AS EXPECTED  Pt is a SBA with FWW. PT/OT ordered. Pt mobilized up in halls.

## 2019-03-22 NOTE — PROGRESS NOTES
Pt is A&O 4  Pain managing at this time on PO medication  Slight nausea, medicated with zofran per MAR  Tolerating Diet GI soft  Surgical incision to midline, CDI, dressing has scant old drainage  + Urine Void   + Flatus  - BM Void  Up standby, ambulated in hallway  SCD's on  Bed alarm on, low fall risk per Jane ethan scale  Reviewed plan of care with patient, bed in lowest position and locked, pt resting comfortably now, call light within reach, all needs met at this time

## 2019-03-22 NOTE — PROGRESS NOTES
"S:  49 y.o.female s/p enterotomy for SBO    POD# 3.  Patient doing ok this am, resting in bed.  States that she \"cant get up out of bed\" for unclear reasons.  She states her pain \"is and isn't\" controlled.  Denies any N/V, voids w/o difficulty, passing flatus but no stool as of yet.  Tolerating diet.      O:  Blood pressure 152/76, pulse 73, temperature 36.4 °C (97.6 °F), temperature source Temporal, resp. rate 18, height 1.626 m (5' 4\"), weight 76.2 kg (168 lb), last menstrual period 03/01/2019, SpO2 95 %, not currently breastfeeding.  I/O last 3 completed shifts:  In: 1738.5 [P.O.:1080; I.V.:658.5]  Out: 2650 [Urine:2650]  Recent Labs      03/19/19   1459  03/20/19   0520  03/21/19   0335   SODIUM  137  136  135   POTASSIUM  3.7  3.9  3.4*   CHLORIDE  98  99  100   CO2  31  30  29   GLUCOSE  87  113*  139*   BUN  15  19  13   CREATININE  0.53  0.57  0.49*   CALCIUM  8.7  8.3*  7.7*     Recent Labs      03/20/19   0520  03/21/19   0335  03/22/19   0510   WBC  3.1*  5.3  6.3   RBC  4.33  3.45*  3.59*   HEMOGLOBIN  12.1  9.8*  10.2*   HEMATOCRIT  37.9  30.7*  32.0*   MCV  87.5  89.0  89.1   MCH  27.9  28.4  28.4   MCHC  31.9*  31.9*  31.9*   RDW  48.1  50.3*  50.4*   PLATELETCT  248  221  242   MPV  11.4  11.2  11.5       Alert and Oriented x3, No Acute Distress  Normal Respiratory Effort  Abdomen soft, appropriately tender  Incisions/Bandages clean/dry/intact  Extremities warm and well perfused      A/P:  Pain control po and IV prn, minimize if able  Diet as tolerated  Fluids TKO  Ambulate tid and ad kamar, will get PT/OT consult to evaluate ambulation  Encourage IS  Once cleared by PT/OT, anticipate discharge    "

## 2019-03-22 NOTE — CARE PLAN
Problem: Pain Management  Goal: Pain level will decrease to patient's comfort goal  Outcome: PROGRESSING AS EXPECTED  Pt transitioned to PO medication for pain management    Problem: Bowel/Gastric:  Goal: Normal bowel function is maintained or improved  Outcome: PROGRESSING AS EXPECTED  Pt +flatus, encouraging ambulation, -BM, normoactive bowel sounds x4

## 2019-03-22 NOTE — CARE PLAN
Problem: Nutritional:  Goal: Achieve adequate nutritional intake  Advance diet beyond CLD and patient will consume 50% or greater of meals   Outcome: PROGRESSING AS EXPECTED  Pt seen today for food preferences. She is happy that her diet was advanced. PO intake % of breakfast this morning. She would like to add snacks between meals. Preferences obtained. Will discontinue clear liquid supplements now that diet is advanced.

## 2019-03-22 NOTE — PROGRESS NOTES
"3/21  S:  49 y.o.female s/p ExLap, enterotomy for SBO  POD# 2.  Patient doing great today, tolerating liquids Po and wants to advance diet.  She is passing flatus and stool and able to ambulate at this time as well.  Pain is well controlled at this time and she denies any nausea or emesis    O:  Blood pressure 110/71, pulse 90, temperature 36.3 °C (97.3 °F), temperature source Temporal, resp. rate 17, height 1.626 m (5' 4\"), weight 76.2 kg (168 lb), last menstrual period 03/01/2019, SpO2 98 %, not currently breastfeeding.  I/O last 3 completed shifts:  In: 3709.7 [P.O.:1080; I.V.:2629.7]  Out: 1775 [Urine:1625]  Recent Labs      03/19/19   1459  03/20/19   0520  03/21/19   0335   SODIUM  137  136  135   POTASSIUM  3.7  3.9  3.4*   CHLORIDE  98  99  100   CO2  31  30  29   GLUCOSE  87  113*  139*   BUN  15  19  13   CREATININE  0.53  0.57  0.49*   CALCIUM  8.7  8.3*  7.7*     Recent Labs      03/20/19   0520  03/21/19   0335   WBC  3.1*  5.3   RBC  4.33  3.45*   HEMOGLOBIN  12.1  9.8*   HEMATOCRIT  37.9  30.7*   MCV  87.5  89.0   MCH  27.9  28.4   MCHC  31.9*  31.9*   RDW  48.1  50.3*   PLATELETCT  248  221   MPV  11.4  11.2       Alert and Oriented x3, No Acute Distress  Normal Respiratory Effort  Abdomen soft, appropriately tender  Incisions/Bandages clean/dry/intact  Extremities warm and well perfused    A/P:  Pain control with PO meds, d/c PCA  Diet as toleraated  Fluids SLIV  Ambulate tid and ad kamar  Likely home tomorrow, ok to shower with incision uncovered    "

## 2019-03-22 NOTE — PROGRESS NOTES
Report received, poc discussed, assumed care of pt.   Call light in reach, hourly rounding in place.   Pt gets up SBA with FWW.   GI soft diet.  + void. LBM pta.   Oxy for pain.  No further needs.

## 2019-03-23 VITALS
HEIGHT: 64 IN | SYSTOLIC BLOOD PRESSURE: 129 MMHG | BODY MASS INDEX: 28.68 KG/M2 | TEMPERATURE: 97.2 F | RESPIRATION RATE: 16 BRPM | OXYGEN SATURATION: 92 % | HEART RATE: 90 BPM | DIASTOLIC BLOOD PRESSURE: 74 MMHG | WEIGHT: 168 LBS

## 2019-03-23 PROCEDURE — 700111 HCHG RX REV CODE 636 W/ 250 OVERRIDE (IP): Performed by: SURGERY

## 2019-03-23 PROCEDURE — 700102 HCHG RX REV CODE 250 W/ 637 OVERRIDE(OP): Performed by: SURGERY

## 2019-03-23 PROCEDURE — A9270 NON-COVERED ITEM OR SERVICE: HCPCS | Performed by: SURGERY

## 2019-03-23 RX ORDER — OXYCODONE HYDROCHLORIDE AND ACETAMINOPHEN 5; 325 MG/1; MG/1
1-2 TABLET ORAL EVERY 4 HOURS PRN
Qty: 30 TAB | Refills: 0 | Status: SHIPPED | OUTPATIENT
Start: 2019-03-23 | End: 2019-04-06

## 2019-03-23 RX ADMIN — OXYCODONE HYDROCHLORIDE 10 MG: 10 TABLET ORAL at 09:19

## 2019-03-23 RX ADMIN — OXYCODONE HYDROCHLORIDE 10 MG: 10 TABLET ORAL at 00:41

## 2019-03-23 RX ADMIN — ENOXAPARIN SODIUM 40 MG: 100 INJECTION SUBCUTANEOUS at 09:19

## 2019-03-23 RX ADMIN — ACETAMINOPHEN 1000 MG: 500 TABLET ORAL at 11:36

## 2019-03-23 RX ADMIN — ACETAMINOPHEN 1000 MG: 500 TABLET ORAL at 00:41

## 2019-03-23 RX ADMIN — OXYCODONE HYDROCHLORIDE 10 MG: 10 TABLET ORAL at 05:30

## 2019-03-23 RX ADMIN — ACETAMINOPHEN 1000 MG: 500 TABLET ORAL at 05:30

## 2019-03-23 NOTE — PROGRESS NOTES
Pt discharged with CNA in W/C. Prescriptions were given x 1. D/C instructions signed and placed in chart. Chart given to U/C. Charge notified. Medications from pts drawer returned to pharmacy. Controlled substance use informed consent signed.    Pt left with FWW

## 2019-03-23 NOTE — CARE PLAN
Problem: Safety  Goal: Will remain free from falls    Intervention: Assess risk factors for falls  Pt room close to nursing station       Problem: Infection  Goal: Will remain free from infection    Intervention: Assess signs and symptoms of infection  Checked morning labs for s/s of infection

## 2019-03-23 NOTE — PROGRESS NOTES
Bedside report received.  Assessment complete.  A&O x 4. Patient calls appropriately.  Patient up with SB assist. Bed alarm NI.   Patient has 8/10 pain. Medicated perMAR  Denies N&V. Tolerating GI soft diet.  Surgical midline and lap stabs with staples, QUAN, CDI.  + void, 3/16 BM.  Patient denies SOB.  Review plan with of care with patient. Call light and personal belongings with in reach. Hourly rounding in place. All needs met at this time.

## 2019-03-23 NOTE — DISCHARGE INSTRUCTIONS
Discharge Instructions    Discharged to home by car with relative. Discharged via wheelchair, hospital escort: Yes.  Special equipment needed: Not Applicable    Be sure to schedule a follow-up appointment with your primary care doctor or any specialists as instructed.     Discharge Plan:   Smoking Cessation Offered: Patient Refused  Pneumococcal Vaccine Administered/Refused: Not given - Patient refused pneumococcal vaccine  Influenza Vaccine Indication: Patient Refuses    I understand that a diet low in cholesterol, fat, and sodium is recommended for good health. Unless I have been given specific instructions below for another diet, I accept this instruction as my diet prescription.   Other diet: per MD order    Special Instructions: None    · Is patient discharged on Warfarin / Coumadin?   No     Depression / Suicide Risk    As you are discharged from this [facility] facility, it is important to learn how to keep safe from harming yourself.    Recognize the warning signs:  · Abrupt changes in personality, positive or negative- including increase in energy   · Giving away possessions  · Change in eating patterns- significant weight changes-  positive or negative  · Change in sleeping patterns- unable to sleep or sleeping all the time   · Unwillingness or inability to communicate  · Depression  · Unusual sadness, discouragement and loneliness  · Talk of wanting to die  · Neglect of personal appearance   · Rebelliousness- reckless behavior  · Withdrawal from people/activities they love  · Confusion- inability to concentrate     If you or a loved one observes any of these behaviors or has concerns about self-harm, here's what you can do:  · Talk about it- your feelings and reasons for harming yourself  · Remove any means that you might use to hurt yourself (examples: pills, rope, extension cords, firearm)  · Get professional help from the community (Mental Health, Substance Abuse, psychological counseling)  · Do not  be alone:Call your Safe Contact- someone whom you trust who will be there for you.  · Call your local CRISIS HOTLINE 049-6141 or 906-647-3469  · Call your local Children's Mobile Crisis Response Team [place] (018) 201-8418 or www.Sounday  · Call the toll free National Suicide Prevention Hotlines   · National Suicide Prevention Lifeline 404-039-CYBC (9813)  · [employer] Hope Line Network 800-SUICIDE (574-3726)      Incision Care, Adult  An incision is a cut that a doctor makes in your skin for surgery (for a procedure). Most times, these cuts are closed after surgery. Your cut from surgery may be closed with stitches (sutures), staples, skin glue, or skin tape (adhesive strips). You may need to return to your doctor to have stitches or staples taken out. This may happen many days or many weeks after your surgery. The cut needs to be well cared for so it does not get infected.  How to care for your cut  Cut care  · Follow instructions from your doctor about how to take care of your cut. Make sure you:  ¨ Wash your hands with soap and water before you change your bandage (dressing). If you cannot use soap and water, use hand .  ¨ Change your bandage as told by your doctor.  ¨ Leave stitches, skin glue, or skin tape in place. They may need to stay in place for 2 weeks or longer. If tape strips get loose and curl up, you may trim the loose edges. Do not remove tape strips completely unless your doctor says it is okay.  · Check your cut area every day for signs of infection. Check for:  ¨ More redness, swelling, or pain.  ¨ More fluid or blood.  ¨ Warmth.  ¨ Pus or a bad smell.  · Ask your doctor how to clean the cut. This may include:  ¨ Using mild soap and water.  ¨ Using a clean towel to pat the cut dry after you clean it.  ¨ Putting a cream or ointment on the cut. Do this only as told by your doctor.  ¨ Covering the cut with a clean bandage.  · Ask your doctor when you can leave the cut  uncovered.  · Do not take baths, swim, or use a hot tub until your doctor says it is okay. Ask your doctor if you can take showers. You may only be allowed to take sponge baths for bathing.  Medicines  · If you were prescribed an antibiotic medicine, cream, or ointment, take the antibiotic or put it on the cut as told by your doctor. Do not stop taking or putting on the antibiotic even if your condition gets better.  · Take over-the-counter and prescription medicines only as told by your doctor.  General instructions  · Limit movement around your cut. This helps healing.  ¨ Avoid straining, lifting, or exercise for the first month, or for as long as told by your doctor.  ¨ Follow instructions from your doctor about going back to your normal activities.  ¨ Ask your doctor what activities are safe.  · Protect your cut from the sun when you are outside for the first 6 months, or for as long as told by your doctor. Put on sunscreen around the scar or cover up the scar.  · Keep all follow-up visits as told by your doctor. This is important.  Contact a doctor if:  · Your have more redness, swelling, or pain around the cut.  · You have more fluid or blood coming from the cut.  · Your cut feels warm to the touch.  · You have pus or a bad smell coming from the cut.  · You have a fever or shaking chills.  · You feel sick to your stomach (nauseous) or you throw up (vomit).  · You are dizzy.  · Your stitches or staples come undone.  Get help right away if:  · You have a red streak coming from your cut.  · Your cut bleeds through the bandage and the bleeding does not stop with gentle pressure.  · The edges of your cut open up and separate.  · You have very bad (severe) pain.  · You have a rash.  · You are confused.  · You pass out (faint).  · You have trouble breathing and you have a fast heartbeat.  This information is not intended to replace advice given to you by your health care provider. Make sure you discuss any questions  you have with your health care provider.  Document Released: 03/11/2013 Document Revised: 08/25/2017 Document Reviewed: 08/25/2017  Elsevier Interactive Patient Education © 2017 [employer]

## 2019-03-23 NOTE — DISCHARGE SUMMARY
Discharge Summary      DATE OF ADMISSION: 3/16/2019    DATE OF DISCHARGE: 3/23/2019    ADMISSION DIAGNOSIS (ES):   Small Bowel Obstruction    DISCHARGE DIAGNOSIS (ES):  SBO secondary to intestinal Bezor    DISCHARGE CONDITION:  ? stable    CONSULTATIONS:  ? none    PROCEDURES:  ? Diagnostic laparoscopy, exploratory laparotomy     BRIEF HPI:  This is a 49-year-old female who presents with a   small-bowel obstruction and a history of exploratory surgery for trauma.  The   patient has refractory symptoms after 48 hours in the hospital, she had a   small bowel follow through, which demonstrated ongoing blockage in the small   intestine.  Risks, benefits, and alternatives of exploratory surgery were   explained to her and we took her to the OR for further procedure.    HOSPITAL COURSE:  ? Pt underwent surgery as described above, tolerated procedure well w/o complications.  Is now eating, denies any N/V, voids w/o difficulty and has positive bowel sounds along with passing flatus.  Meets surgical criteria to discharge.        MEDS:   No current outpatient prescriptions on file.       FOLLOW-UP:  ? Please call my office at [phone] to make an appointment in 1 week.    DISCHARGE INSTRUCTIONS:  May shower  No lifting greater than 20 pounds for 4-6 weeks  Diet as tolerated, chew food thoroughly

## 2019-03-23 NOTE — CARE PLAN
"Problem: Bowel/Gastric:  Goal: Will not experience complications related to bowel motility  Outcome: PROGRESSING AS EXPECTED  Normoactive BS x 4 noted. Patient complaining food isn't moving. Fluids/ambulation encouraged. Patient advised to slow down on food/snacks for the NOC shift. Verbalized understanding.     Problem: Knowledge Deficit  Goal: Knowledge of disease process/condition, treatment plan, diagnostic tests, and medications will improve  Outcome: PROGRESSING AS EXPECTED  Educated patient about advancement of diet. Verbalized understanding.     Problem: Mobility  Goal: Risk for activity intolerance will decrease  Outcome: PROGRESSING SLOWER THAN EXPECTED  Encouraged increased ambulation. Patient stated \"I can't. I get dizzy and I'm in too much pain.\" Oxygen provided. Patient complied, moaning during activity.       "

## 2019-03-23 NOTE — PROGRESS NOTES
"Received report from AM RN; assumed care. A&O x 3. VSS. 94% on 2L d/t keeping neck flexed, positioning in bed. Patient approached for assessment, calm/resting in bed. Once spoken to, moaning/grimacing/tearfulness noted. Patient reporting pain in mid/upper epigastric area at a 10/10 for pain; medicated. See MAR. Patient stated feels like food is sitting and not moving; encouraged ambulation. Patient complied, steady gait noted with CNA. Patient moaning intermittently during ambulation. Abdomen semi-firm, tender. MLI QUAN with staples, CDI. Patient denies flatus. Normoactive BS x 4 noted. POC discussed. Encouraged IS and increased ambulation, patient states she \"can't because of pain.\" POC discussed. Bed locked/lowest position. Call light/personal belongings within reach. All needs met at present time.   "

## 2025-01-17 NOTE — ANESTHESIA PROCEDURE NOTES
Airway  Date/Time: 3/19/2019 3:16 PM  Performed by: CHRISTELLE GRIGSBY  Authorized by: CHRISTELLE GRIGSBY     Location:  OR  Urgency:  Elective  Indications for Airway Management:  Anesthesia  Spontaneous Ventilation: absent    Sedation Level:  Deep  Preoxygenated: Yes    Patient Position:  Sniffing  Final Airway Type:  Endotracheal airway  Final Endotracheal Airway:  ETT  Cuffed: Yes    Technique Used for Successful ETT Placement:  Direct laryngoscopy  Insertion Site:  Oral  Blade Type:  Pena  Laryngoscope Blade/Videolaryngoscope Blade Size:  2  ETT Size (mm):  7.5  Measured from:  Teeth  ETT to Teeth (cm):  22  Placement Verified by: auscultation and capnometry    Cormack-Lehane Classification:  Grade IIa - partial view of glottis  Number of Attempts at Approach:  1         40

## (undated) DEVICE — LACTATED RINGERS INJ 1000 ML - (14EA/CA 60CA/PF)

## (undated) DEVICE — GLOVE BIOGEL SZ 7 SURGICAL PF LTX - (50PR/BX 4BX/CA)

## (undated) DEVICE — SUTURE GENERAL

## (undated) DEVICE — PACK MAJOR BASIN - (2EA/CA)

## (undated) DEVICE — SUTURE 4-0 MONOCRYL PLUS PS-2 - 27 INCH (36/BX)

## (undated) DEVICE — GLOVE BIOGEL SZ 6.5 SURGICAL PF LTX (50PR/BX 4BX/CA)

## (undated) DEVICE — SET EXTENSION WITH 2 PORTS (48EA/CA) ***PART #2C8610 IS A SUBSTITUTE*****

## (undated) DEVICE — SCISSORS 5MM CVD (6EA/BX)

## (undated) DEVICE — SET SUCTION/IRRIGATION WITH DISPOSABLE TIP (6/CA )PART #0250-070-520 IS A SUB

## (undated) DEVICE — NEPTUNE 4 PORT MANIFOLD - (20/PK)

## (undated) DEVICE — GOWN WARMING STANDARD FLEX - (30/CA)

## (undated) DEVICE — TUBING CLEARLINK DUO-VENT - C-FLO (48EA/CA)

## (undated) DEVICE — SENSOR SPO2 NEO LNCS ADHESIVE (20/BX) SEE USER NOTES

## (undated) DEVICE — ELECTRODE 5MM LHK LAPSCP STERILE DISP- MEGADYNE  (5/CA)

## (undated) DEVICE — SUTURE 3-0 VICRYL PLUS SH - 8X 18 INCH (12/BX)

## (undated) DEVICE — PAD LAP STERILE 18 X 18 - (5/PK 40PK/CA)

## (undated) DEVICE — STAPLER SKIN DISP - (6/BX 10BX/CA) VISISTAT

## (undated) DEVICE — GLOVE SZ 6 BIOGEL PI MICRO - PF LF (50PR/BX 4BX/CA)

## (undated) DEVICE — CHLORAPREP 26 ML APPLICATOR - ORANGE TINT(25/CA)

## (undated) DEVICE — CANNULA W/SEAL 5X100 Z-THRE - ADED KII (12/BX)

## (undated) DEVICE — ELECTRODE 850 FOAM ADHESIVE - HYDROGEL RADIOTRNSPRNT (50/PK)

## (undated) DEVICE — ELECTRODE DUAL RETURN W/ CORD - (50/PK)

## (undated) DEVICE — SUTURE 0 COATED VICRYL 6-18IN - (12PK/BX)

## (undated) DEVICE — GLOVE BIOGEL PI INDICATOR SZ 6.5 SURGICAL PF LF - (50/BX 4BX/CA)

## (undated) DEVICE — KIT ANESTHESIA W/CIRCUIT & 3/LT BAG W/FILTER (20EA/CA)

## (undated) DEVICE — TROCAR 5X100 NON BLADED Z-TH - READ KII (6/BX)

## (undated) DEVICE — DETERGENT RENUZYME PLUS 10 OZ PACKET (50/BX)

## (undated) DEVICE — SET LEADWIRE 5 LEAD BEDSIDE DISPOSABLE ECG (1SET OF 5/EA)

## (undated) DEVICE — MASK ANESTHESIA ADULT  - (100/CA)

## (undated) DEVICE — SODIUM CHL IRRIGATION 0.9% 1000ML (12EA/CA)

## (undated) DEVICE — GLOVE BIOGEL INDICATOR SZ 7SURGICAL PF LTX - (50/BX 4BX/CA)

## (undated) DEVICE — GOWN SURGEONS LARGE - (32/CA)

## (undated) DEVICE — GLOVE BIOGEL INDICATOR SZ 6.5 SURGICAL PF LTX - (50PR/BX 4BX/CA)

## (undated) DEVICE — SUCTION INSTRUMENT YANKAUER BULBOUS TIP W/O VENT (50EA/CA)

## (undated) DEVICE — DRAPE SURG STERI-DRAPE 7X11OD - (40EA/CA)

## (undated) DEVICE — SUTURE 1 PDS II PLUS TP-1 - (12PK/BX)

## (undated) DEVICE — KIT ROOM DECONTAMINATION

## (undated) DEVICE — SLEEVE, VASO, THIGH, MED

## (undated) DEVICE — PROTECTOR ULNA NERVE - (36PR/CA)

## (undated) DEVICE — TRAY CATHETER FOLEY URINE METER W/STATLOCK 350ML (10EA/CA)

## (undated) DEVICE — GRAFT MESH SEPRAFILM PRO PACK - 5/BX CONTAINS 6 3X5 PIECES

## (undated) DEVICE — CANISTER SUCTION 3000ML MECHANICAL FILTER AUTO SHUTOFF MEDI-VAC NONSTERILE LF DISP  (40EA/CA)

## (undated) DEVICE — TUBE CONNECT SUCTION CLEAR 120 X 1/4" (50EA/CA)"

## (undated) DEVICE — HEAD HOLDER JUNIOR/ADULT